# Patient Record
Sex: FEMALE | Race: BLACK OR AFRICAN AMERICAN | NOT HISPANIC OR LATINO
[De-identification: names, ages, dates, MRNs, and addresses within clinical notes are randomized per-mention and may not be internally consistent; named-entity substitution may affect disease eponyms.]

---

## 2017-05-15 ENCOUNTER — RESULT REVIEW (OUTPATIENT)
Age: 37
End: 2017-05-15

## 2017-06-12 PROBLEM — Z00.00 ENCOUNTER FOR PREVENTIVE HEALTH EXAMINATION: Status: ACTIVE | Noted: 2017-06-12

## 2017-06-13 ENCOUNTER — RECORD ABSTRACTING (OUTPATIENT)
Age: 37
End: 2017-06-13

## 2017-06-13 DIAGNOSIS — Z72.3 LACK OF PHYSICAL EXERCISE: ICD-10-CM

## 2017-06-13 DIAGNOSIS — Z82.49 FAMILY HISTORY OF ISCHEMIC HEART DISEASE AND OTHER DISEASES OF THE CIRCULATORY SYSTEM: ICD-10-CM

## 2017-06-13 DIAGNOSIS — Z87.898 PERSONAL HISTORY OF OTHER SPECIFIED CONDITIONS: ICD-10-CM

## 2017-06-13 DIAGNOSIS — F15.90 OTHER STIMULANT USE, UNSPECIFIED, UNCOMPLICATED: ICD-10-CM

## 2017-06-13 DIAGNOSIS — O14.90 UNSPECIFIED PRE-ECLAMPSIA, UNSPECIFIED TRIMESTER: ICD-10-CM

## 2017-06-13 DIAGNOSIS — Z78.9 OTHER SPECIFIED HEALTH STATUS: ICD-10-CM

## 2017-06-21 ENCOUNTER — APPOINTMENT (OUTPATIENT)
Dept: HEART AND VASCULAR | Facility: CLINIC | Age: 37
End: 2017-06-21

## 2017-06-21 ENCOUNTER — LABORATORY RESULT (OUTPATIENT)
Age: 37
End: 2017-06-21

## 2017-06-21 VITALS
TEMPERATURE: 98.1 F | SYSTOLIC BLOOD PRESSURE: 160 MMHG | BODY MASS INDEX: 34.89 KG/M2 | WEIGHT: 214.51 LBS | HEIGHT: 65.75 IN | HEART RATE: 90 BPM | OXYGEN SATURATION: 98 % | DIASTOLIC BLOOD PRESSURE: 84 MMHG

## 2017-06-21 DIAGNOSIS — R73.02 IMPAIRED GLUCOSE TOLERANCE (ORAL): ICD-10-CM

## 2017-06-21 DIAGNOSIS — C81.90 HODGKIN LYMPHOMA, UNSPECIFIED, UNSPECIFIED SITE: ICD-10-CM

## 2017-06-21 DIAGNOSIS — E66.9 OBESITY, UNSPECIFIED: ICD-10-CM

## 2017-06-21 RX ORDER — LISINOPRIL 10 MG/1
10 TABLET ORAL DAILY
Refills: 0 | Status: DISCONTINUED | COMMUNITY
End: 2017-06-21

## 2017-06-22 LAB
ALBUMIN SERPL ELPH-MCNC: 4.2 G/DL
ALP BLD-CCNC: 65 U/L
ALT SERPL-CCNC: 15 U/L
ANION GAP SERPL CALC-SCNC: 16 MMOL/L
AST SERPL-CCNC: 17 U/L
BILIRUB SERPL-MCNC: 0.2 MG/DL
BUN SERPL-MCNC: 8 MG/DL
CALCIUM SERPL-MCNC: 9.6 MG/DL
CHLORIDE SERPL-SCNC: 102 MMOL/L
CO2 SERPL-SCNC: 25 MMOL/L
CREAT SERPL-MCNC: 0.7 MG/DL
GLUCOSE SERPL-MCNC: 101 MG/DL
HBA1C MFR BLD HPLC: 6 %
NT-PROBNP SERPL-MCNC: 214 PG/ML
POTASSIUM SERPL-SCNC: 3.9 MMOL/L
PROT SERPL-MCNC: 8.1 G/DL
SODIUM SERPL-SCNC: 143 MMOL/L
T4 FREE SERPL-MCNC: 1.1 NG/DL

## 2017-07-25 ENCOUNTER — APPOINTMENT (OUTPATIENT)
Dept: HEART AND VASCULAR | Facility: CLINIC | Age: 37
End: 2017-07-25

## 2017-07-25 VITALS
HEIGHT: 65.75 IN | DIASTOLIC BLOOD PRESSURE: 74 MMHG | SYSTOLIC BLOOD PRESSURE: 120 MMHG | BODY MASS INDEX: 33.67 KG/M2 | TEMPERATURE: 97.9 F | WEIGHT: 206.99 LBS | OXYGEN SATURATION: 100 % | HEART RATE: 80 BPM

## 2017-07-25 DIAGNOSIS — I35.1 NONRHEUMATIC AORTIC (VALVE) INSUFFICIENCY: ICD-10-CM

## 2017-07-26 LAB
ANION GAP SERPL CALC-SCNC: 16 MMOL/L
BUN SERPL-MCNC: 11 MG/DL
CALCIUM SERPL-MCNC: 10 MG/DL
CHLORIDE SERPL-SCNC: 99 MMOL/L
CO2 SERPL-SCNC: 24 MMOL/L
CREAT SERPL-MCNC: 0.78 MG/DL
GLUCOSE SERPL-MCNC: 77 MG/DL
POTASSIUM SERPL-SCNC: 4 MMOL/L
SODIUM SERPL-SCNC: 139 MMOL/L

## 2017-08-09 ENCOUNTER — APPOINTMENT (OUTPATIENT)
Dept: INTERNAL MEDICINE | Facility: CLINIC | Age: 37
End: 2017-08-09

## 2017-08-22 ENCOUNTER — APPOINTMENT (OUTPATIENT)
Dept: ENDOCRINOLOGY | Facility: CLINIC | Age: 37
End: 2017-08-22

## 2017-11-01 ENCOUNTER — APPOINTMENT (OUTPATIENT)
Dept: HEART AND VASCULAR | Facility: CLINIC | Age: 37
End: 2017-11-01

## 2017-11-07 ENCOUNTER — RESULT CHARGE (OUTPATIENT)
Age: 37
End: 2017-11-07

## 2017-11-08 ENCOUNTER — APPOINTMENT (OUTPATIENT)
Dept: HEART AND VASCULAR | Facility: CLINIC | Age: 37
End: 2017-11-08
Payer: MEDICAID

## 2017-11-08 VITALS
WEIGHT: 209.99 LBS | TEMPERATURE: 98.1 F | HEIGHT: 65.75 IN | OXYGEN SATURATION: 99 % | HEART RATE: 71 BPM | BODY MASS INDEX: 34.15 KG/M2 | DIASTOLIC BLOOD PRESSURE: 87 MMHG | SYSTOLIC BLOOD PRESSURE: 160 MMHG

## 2017-11-08 DIAGNOSIS — E03.9 HYPOTHYROIDISM, UNSPECIFIED: ICD-10-CM

## 2017-11-08 DIAGNOSIS — M25.559 PAIN IN UNSPECIFIED HIP: ICD-10-CM

## 2017-11-08 PROCEDURE — 99214 OFFICE O/P EST MOD 30 MIN: CPT | Mod: 25

## 2017-11-08 PROCEDURE — 93000 ELECTROCARDIOGRAM COMPLETE: CPT

## 2017-11-14 LAB
ANION GAP SERPL CALC-SCNC: 14 MMOL/L
BUN SERPL-MCNC: 9 MG/DL
CALCIUM SERPL-MCNC: 9.6 MG/DL
CHLORIDE SERPL-SCNC: 102 MMOL/L
CO2 SERPL-SCNC: 25 MMOL/L
CREAT SERPL-MCNC: 0.73 MG/DL
GLUCOSE SERPL-MCNC: 86 MG/DL
POTASSIUM SERPL-SCNC: 3.9 MMOL/L
SODIUM SERPL-SCNC: 141 MMOL/L
T4 FREE SERPL-MCNC: 1.1 NG/DL
TSH SERPL-ACNC: 7.91 UIU/ML

## 2018-08-07 ENCOUNTER — APPOINTMENT (OUTPATIENT)
Dept: HEART AND VASCULAR | Facility: CLINIC | Age: 38
End: 2018-08-07

## 2018-09-25 ENCOUNTER — APPOINTMENT (OUTPATIENT)
Dept: HEART AND VASCULAR | Facility: CLINIC | Age: 38
End: 2018-09-25

## 2018-09-27 ENCOUNTER — LABORATORY RESULT (OUTPATIENT)
Age: 38
End: 2018-09-27

## 2018-09-27 ENCOUNTER — APPOINTMENT (OUTPATIENT)
Dept: HEART AND VASCULAR | Facility: CLINIC | Age: 38
End: 2018-09-27
Payer: MEDICAID

## 2018-09-27 VITALS
OXYGEN SATURATION: 98 % | SYSTOLIC BLOOD PRESSURE: 138 MMHG | HEIGHT: 65.75 IN | DIASTOLIC BLOOD PRESSURE: 72 MMHG | TEMPERATURE: 98.4 F | WEIGHT: 212.99 LBS | HEART RATE: 85 BPM | BODY MASS INDEX: 34.64 KG/M2

## 2018-09-27 PROCEDURE — 36415 COLL VENOUS BLD VENIPUNCTURE: CPT

## 2018-09-27 PROCEDURE — 90630: CPT

## 2018-09-27 PROCEDURE — 99214 OFFICE O/P EST MOD 30 MIN: CPT

## 2018-09-27 PROCEDURE — 93000 ELECTROCARDIOGRAM COMPLETE: CPT

## 2018-09-27 PROCEDURE — 90471 IMMUNIZATION ADMIN: CPT

## 2018-09-28 LAB
ANION GAP SERPL CALC-SCNC: 13 MMOL/L
BUN SERPL-MCNC: 10 MG/DL
CALCIUM SERPL-MCNC: 9.6 MG/DL
CHLORIDE SERPL-SCNC: 103 MMOL/L
CO2 SERPL-SCNC: 24 MMOL/L
CREAT SERPL-MCNC: 0.76 MG/DL
GLUCOSE SERPL-MCNC: 131 MG/DL
HBA1C MFR BLD HPLC: 6 %
POTASSIUM SERPL-SCNC: 3.8 MMOL/L
SODIUM SERPL-SCNC: 140 MMOL/L

## 2018-10-25 ENCOUNTER — APPOINTMENT (OUTPATIENT)
Dept: HEART AND VASCULAR | Facility: CLINIC | Age: 38
End: 2018-10-25
Payer: MEDICAID

## 2018-10-25 DIAGNOSIS — R07.9 CHEST PAIN, UNSPECIFIED: ICD-10-CM

## 2018-10-25 PROCEDURE — 93325 DOPPLER ECHO COLOR FLOW MAPG: CPT | Mod: 59

## 2018-10-25 PROCEDURE — 93320 DOPPLER ECHO COMPLETE: CPT

## 2018-10-25 PROCEDURE — 93351 STRESS TTE COMPLETE: CPT

## 2018-11-01 ENCOUNTER — APPOINTMENT (OUTPATIENT)
Dept: HEART AND VASCULAR | Facility: CLINIC | Age: 38
End: 2018-11-01

## 2018-11-08 ENCOUNTER — APPOINTMENT (OUTPATIENT)
Dept: NEPHROLOGY | Facility: CLINIC | Age: 38
End: 2018-11-08

## 2018-11-08 ENCOUNTER — APPOINTMENT (OUTPATIENT)
Dept: ENDOCRINOLOGY | Facility: CLINIC | Age: 38
End: 2018-11-08
Payer: MEDICAID

## 2018-11-08 VITALS
HEART RATE: 73 BPM | HEIGHT: 66 IN | SYSTOLIC BLOOD PRESSURE: 148 MMHG | BODY MASS INDEX: 34.87 KG/M2 | WEIGHT: 217 LBS | DIASTOLIC BLOOD PRESSURE: 84 MMHG

## 2018-11-08 DIAGNOSIS — E89.0 POSTPROCEDURAL HYPOTHYROIDISM: ICD-10-CM

## 2018-11-08 PROCEDURE — 99204 OFFICE O/P NEW MOD 45 MIN: CPT

## 2018-11-15 ENCOUNTER — APPOINTMENT (OUTPATIENT)
Dept: CT IMAGING | Facility: HOSPITAL | Age: 38
End: 2018-11-15

## 2019-02-07 ENCOUNTER — APPOINTMENT (OUTPATIENT)
Dept: ENDOCRINOLOGY | Facility: CLINIC | Age: 39
End: 2019-02-07

## 2019-06-26 ENCOUNTER — APPOINTMENT (OUTPATIENT)
Dept: CT IMAGING | Facility: HOSPITAL | Age: 39
End: 2019-06-26

## 2019-06-26 ENCOUNTER — APPOINTMENT (OUTPATIENT)
Dept: HEART AND VASCULAR | Facility: CLINIC | Age: 39
End: 2019-06-26

## 2020-02-19 ENCOUNTER — APPOINTMENT (OUTPATIENT)
Dept: HEART AND VASCULAR | Facility: CLINIC | Age: 40
End: 2020-02-19

## 2020-04-09 PROBLEM — M25.559 HIP PAIN: Status: ACTIVE | Noted: 2017-11-08

## 2020-09-25 ENCOUNTER — LABORATORY RESULT (OUTPATIENT)
Age: 40
End: 2020-09-25

## 2020-09-25 ENCOUNTER — APPOINTMENT (OUTPATIENT)
Dept: HEART AND VASCULAR | Facility: CLINIC | Age: 40
End: 2020-09-25
Payer: MEDICAID

## 2020-09-25 ENCOUNTER — NON-APPOINTMENT (OUTPATIENT)
Age: 40
End: 2020-09-25

## 2020-09-25 VITALS
TEMPERATURE: 98.1 F | HEART RATE: 90 BPM | HEIGHT: 65.75 IN | DIASTOLIC BLOOD PRESSURE: 96 MMHG | WEIGHT: 209 LBS | BODY MASS INDEX: 33.99 KG/M2 | OXYGEN SATURATION: 97 % | SYSTOLIC BLOOD PRESSURE: 210 MMHG

## 2020-09-25 DIAGNOSIS — Z23 ENCOUNTER FOR IMMUNIZATION: ICD-10-CM

## 2020-09-25 PROCEDURE — 36415 COLL VENOUS BLD VENIPUNCTURE: CPT

## 2020-09-25 PROCEDURE — 93000 ELECTROCARDIOGRAM COMPLETE: CPT

## 2020-09-25 PROCEDURE — 99214 OFFICE O/P EST MOD 30 MIN: CPT

## 2020-09-25 RX ORDER — CHLORTHALIDONE 25 MG/1
25 TABLET ORAL DAILY
Qty: 90 | Refills: 2 | Status: DISCONTINUED | COMMUNITY
End: 2020-09-25

## 2020-09-25 RX ORDER — FUROSEMIDE 20 MG/1
20 TABLET ORAL
Qty: 90 | Refills: 2 | Status: DISCONTINUED | COMMUNITY
End: 2020-09-25

## 2020-09-28 ENCOUNTER — MED ADMIN CHARGE (OUTPATIENT)
Age: 40
End: 2020-09-28

## 2020-09-29 LAB
ALBUMIN SERPL ELPH-MCNC: 4.7 G/DL
ALP BLD-CCNC: 67 U/L
ALT SERPL-CCNC: 10 U/L
ANION GAP SERPL CALC-SCNC: 21 MMOL/L
AST SERPL-CCNC: 12 U/L
BASOPHILS # BLD AUTO: 0.06 K/UL
BASOPHILS NFR BLD AUTO: 0.5 %
BILIRUB SERPL-MCNC: 0.3 MG/DL
BUN SERPL-MCNC: 13 MG/DL
CALCIUM SERPL-MCNC: 10 MG/DL
CHLORIDE SERPL-SCNC: 101 MMOL/L
CHOLEST SERPL-MCNC: 224 MG/DL
CHOLEST/HDLC SERPL: 4.4 RATIO
CO2 SERPL-SCNC: 17 MMOL/L
CREAT SERPL-MCNC: 0.73 MG/DL
EOSINOPHIL # BLD AUTO: 0.49 K/UL
EOSINOPHIL NFR BLD AUTO: 4.3 %
ESTIMATED AVERAGE GLUCOSE: 126 MG/DL
GLUCOSE SERPL-MCNC: 94 MG/DL
HBA1C MFR BLD HPLC: 6 %
HCT VFR BLD CALC: 36.4 %
HDLC SERPL-MCNC: 52 MG/DL
HGB BLD-MCNC: 10 G/DL
IMM GRANULOCYTES NFR BLD AUTO: 0.3 %
LDLC SERPL CALC-MCNC: 158 MG/DL
LYMPHOCYTES # BLD AUTO: 3.69 K/UL
LYMPHOCYTES NFR BLD AUTO: 32.6 %
MAN DIFF?: NORMAL
MCHC RBC-ENTMCNC: 23.4 PG
MCHC RBC-ENTMCNC: 27.5 GM/DL
MCV RBC AUTO: 85.2 FL
MONOCYTES # BLD AUTO: 0.37 K/UL
MONOCYTES NFR BLD AUTO: 3.3 %
NEUTROPHILS # BLD AUTO: 6.67 K/UL
NEUTROPHILS NFR BLD AUTO: 59 %
NT-PROBNP SERPL-MCNC: 203 PG/ML
PLATELET # BLD AUTO: 266 K/UL
POTASSIUM SERPL-SCNC: 3.9 MMOL/L
PROT SERPL-MCNC: 8.6 G/DL
RBC # BLD: 4.27 M/UL
RBC # FLD: 17.4 %
SARS-COV-2 IGG SERPL IA-ACNC: <0.1 INDEX
SARS-COV-2 IGG SERPL QL IA: NEGATIVE
SODIUM SERPL-SCNC: 139 MMOL/L
T4 FREE SERPL-MCNC: 1.1 NG/DL
TRIGL SERPL-MCNC: 73 MG/DL
TSH SERPL-ACNC: 7.8 UIU/ML
WBC # FLD AUTO: 11.31 K/UL

## 2020-09-29 NOTE — PROCEDURE
[Size: ______] : The left ventricular size is [unfilled] [Wall Motion: ______] : The left ventricular wall motion is [unfilled] [Ejection Fraction: ______] : The left ventricular ejection fraction is [unfilled] [Normal] : 3. No pericardial effusion. [ECG Atrial Arrhythmias] : no arrhythmias [de-identified] : 134 [de-identified] : 180/80 [de-identified] : 75%; METS 7.4 [de-identified] : mild left ventricular global hypokinesis LVEF 45-50% [de-identified] : Submaximal stress test target heart rate was not achieved no wall motion abnormalities post stress images no wall motion abnormalities [TWNoteComboBox1] : АНДРЕЙ [TWNoteComboBox2] : Doug [TWNoteComboBox4] : dyspnea [TWNoteComboBox3] : 2 [TWNoteComboBox5] : dyspnea [de-identified] : Dr. Pascual Malhotra (card) [de-identified] : Nicole Petri-Schoener, [facility] [de-identified] : Chest Pain [de-identified] : 1.0 [de-identified] : 0.9 [de-identified] : 4.8 [de-identified] : 3.8 [de-identified] : 2.9 [de-identified] : 3.5 [de-identified] : 34 mmHg [de-identified] : 45-50% [de-identified] : MIld global hypokinesis  [de-identified] : The left atrial volume index is 25 ml/m2. grade 1 diastolic dysfunction  [de-identified] : Trileaflet with Calcified leaflets mild to moderate aortic eccentric insufficiency aortic sclerosis without stenosis  [de-identified] : There is trace pulmonic regurgitation.   [de-identified] : Trace to mild tricuspid regurgitation. [de-identified] : The inferior vena cava is 1.6 cm. with greater than 50% [de-identified] : Mildly reduced left ventricular systolic function mild left ventricular hypokinesis normal right ventricle [de-identified] : Thickened mitral valve leaflets mild to moderate mitral regurgitation thickedn calcified aortic valve leaflets without stenosis mild to moderate aortic insufficiency [FreeTextEntry1] : : 1980\par Age: 38y\par Sex: F\par BP: 138/72\par Height: 167 cm\par Weight: 97 kg\par BSA: 2.1 m2

## 2020-09-29 NOTE — IMPRESSION
[FreeTextEntry1] : Submaximal echo stress test. Normal post exercise left ventricular function at submaximal heart rate. Normal EKG stress test.

## 2020-09-29 NOTE — HISTORY OF PRESENT ILLNESS
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

## 2020-09-29 NOTE — PHYSICAL EXAM
[General Appearance - Well Developed] : well developed [Normal Appearance] : normal appearance [Well Groomed] : well groomed [General Appearance - Well Nourished] : well nourished [No Deformities] : no deformities [General Appearance - In No Acute Distress] : no acute distress [Normal Conjunctiva] : the conjunctiva exhibited no abnormalities [Eyelids - No Xanthelasma] : the eyelids demonstrated no xanthelasmas [Normal Oral Mucosa] : normal oral mucosa [No Oral Pallor] : no oral pallor [No Oral Cyanosis] : no oral cyanosis [Normal Jugular Venous A Waves Present] : normal jugular venous A waves present [Normal Jugular Venous V Waves Present] : normal jugular venous V waves present [No Jugular Venous Ceron A Waves] : no jugular venous ceron A waves [Respiration, Rhythm And Depth] : normal respiratory rhythm and effort [Exaggerated Use Of Accessory Muscles For Inspiration] : no accessory muscle use [Auscultation Breath Sounds / Voice Sounds] : lungs were clear to auscultation bilaterally [Heart Rate And Rhythm] : heart rate and rhythm were normal [Heart Sounds] : normal S1 and S2 [Murmurs] : no murmurs present [Abdomen Soft] : soft [Abdomen Tenderness] : non-tender [Abdomen Mass (___ Cm)] : no abdominal mass palpated [Abnormal Walk] : normal gait [Gait - Sufficient For Exercise Testing] : the gait was sufficient for exercise testing [Nail Clubbing] : no clubbing of the fingernails [Cyanosis, Localized] : no localized cyanosis [Petechial Hemorrhages (___cm)] : no petechial hemorrhages [Skin Color & Pigmentation] : normal skin color and pigmentation [] : no rash [No Venous Stasis] : no venous stasis [Skin Lesions] : no skin lesions [No Skin Ulcers] : no skin ulcer [No Xanthoma] : no  xanthoma was observed [Oriented To Time, Place, And Person] : oriented to person, place, and time [Affect] : the affect was normal [Mood] : the mood was normal [No Anxiety] : not feeling anxious

## 2020-09-29 NOTE — ASSESSMENT
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

## 2020-09-30 ENCOUNTER — APPOINTMENT (OUTPATIENT)
Dept: HEART AND VASCULAR | Facility: CLINIC | Age: 40
End: 2020-09-30
Payer: MEDICAID

## 2020-09-30 VITALS
OXYGEN SATURATION: 97 % | HEART RATE: 90 BPM | BODY MASS INDEX: 34.82 KG/M2 | HEIGHT: 65 IN | SYSTOLIC BLOOD PRESSURE: 126 MMHG | WEIGHT: 209 LBS | DIASTOLIC BLOOD PRESSURE: 74 MMHG

## 2020-09-30 PROCEDURE — 99214 OFFICE O/P EST MOD 30 MIN: CPT

## 2020-09-30 NOTE — PROCEDURE
[ECG Atrial Arrhythmias] : no arrhythmias [de-identified] : 134 [de-identified] : 180/80 [de-identified] : 75%; METS 7.4 [de-identified] : mild left ventricular global hypokinesis LVEF 45-50% [de-identified] : Submaximal stress test target heart rate was not achieved no wall motion abnormalities post stress images no wall motion abnormalities [TWNoteComboBox1] : АНДРЙЕ [TWNoteComboBox2] : Doug [TWNoteComboBox4] : dyspnea [TWNoteComboBox3] : 2 [TWNoteComboBox5] : dyspnea [Size: ______] : The left ventricular size is [unfilled] [Wall Motion: ______] : The left ventricular wall motion is [unfilled] [Ejection Fraction: ______] : The left ventricular ejection fraction is [unfilled] [Normal] : 3. No pericardial effusion. [de-identified] : Dr. Pascual Malhotra (card) [de-identified] : Nicole Petri-Schoener, [facility] [de-identified] : Chest Pain [de-identified] : 1.0 [de-identified] : 0.9 [de-identified] : 4.8 [de-identified] : 3.8 [de-identified] : 2.9 [de-identified] : 3.5 [de-identified] : 34 mmHg [de-identified] : 45-50% [de-identified] : MIld global hypokinesis  [de-identified] : The left atrial volume index is 25 ml/m2. grade 1 diastolic dysfunction  [de-identified] : Trileaflet with Calcified leaflets mild to moderate aortic eccentric insufficiency aortic sclerosis without stenosis  [de-identified] : There is trace pulmonic regurgitation.   [de-identified] : Trace to mild tricuspid regurgitation. [de-identified] : The inferior vena cava is 1.6 cm. with greater than 50% [de-identified] : Mildly reduced left ventricular systolic function mild left ventricular hypokinesis normal right ventricle [de-identified] : Thickened mitral valve leaflets mild to moderate mitral regurgitation thickedn calcified aortic valve leaflets without stenosis mild to moderate aortic insufficiency [FreeTextEntry1] : : 1980\par Age: 38y\par Sex: F\par BP: 138/72\par Height: 167 cm\par Weight: 97 kg\par BSA: 2.1 m2

## 2020-09-30 NOTE — ASSESSMENT
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

## 2020-09-30 NOTE — HISTORY OF PRESENT ILLNESS
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

## 2020-10-23 ENCOUNTER — APPOINTMENT (OUTPATIENT)
Dept: HEART AND VASCULAR | Facility: CLINIC | Age: 40
End: 2020-10-23

## 2020-12-30 ENCOUNTER — APPOINTMENT (OUTPATIENT)
Dept: HEART AND VASCULAR | Facility: CLINIC | Age: 40
End: 2020-12-30
Payer: MEDICAID

## 2020-12-30 ENCOUNTER — NON-APPOINTMENT (OUTPATIENT)
Age: 40
End: 2020-12-30

## 2020-12-30 VITALS
HEART RATE: 87 BPM | SYSTOLIC BLOOD PRESSURE: 146 MMHG | OXYGEN SATURATION: 99 % | HEIGHT: 65 IN | TEMPERATURE: 98.6 F | WEIGHT: 212.99 LBS | BODY MASS INDEX: 35.49 KG/M2 | DIASTOLIC BLOOD PRESSURE: 80 MMHG

## 2020-12-30 PROCEDURE — 93306 TTE W/DOPPLER COMPLETE: CPT

## 2020-12-30 PROCEDURE — 36415 COLL VENOUS BLD VENIPUNCTURE: CPT

## 2020-12-30 PROCEDURE — 99214 OFFICE O/P EST MOD 30 MIN: CPT

## 2020-12-30 PROCEDURE — 99072 ADDL SUPL MATRL&STAF TM PHE: CPT

## 2020-12-30 PROCEDURE — 93000 ELECTROCARDIOGRAM COMPLETE: CPT

## 2020-12-30 NOTE — ASSESSMENT
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

## 2020-12-30 NOTE — PROCEDURE
[Size: ______] : The left ventricular size is [unfilled] [Wall Motion: ______] : The left ventricular wall motion is [unfilled] [Ejection Fraction: ______] : The left ventricular ejection fraction is [unfilled] [Normal] : 3. No pericardial effusion. [ECG Atrial Arrhythmias] : no arrhythmias [de-identified] : 134 [de-identified] : 180/80 [de-identified] : 75%; METS 7.4 [de-identified] : mild left ventricular global hypokinesis LVEF 45-50% [de-identified] : Submaximal stress test target heart rate was not achieved no wall motion abnormalities post stress images no wall motion abnormalities [TWNoteComboBox1] : АНДРЕЙ [TWNoteComboBox2] : Doug [TWNoteComboBox4] : dyspnea [TWNoteComboBox3] : 2 [TWNoteComboBox5] : dyspnea [de-identified] : Dr. Pascual Malhotra (card) [de-identified] : Nicole Petri-Schoener, [facility] [de-identified] : Chest Pain [de-identified] : 1.0 [de-identified] : 0.9 [de-identified] : 4.8 [de-identified] : 3.8 [de-identified] : 2.9 [de-identified] : 3.5 [de-identified] : 34 mmHg [de-identified] : 45-50% [de-identified] : MIld global hypokinesis  [de-identified] : The left atrial volume index is 25 ml/m2. grade 1 diastolic dysfunction  [de-identified] : Trileaflet with Calcified leaflets mild to moderate aortic eccentric insufficiency aortic sclerosis without stenosis  [de-identified] : There is trace pulmonic regurgitation.   [de-identified] : Trace to mild tricuspid regurgitation. [de-identified] : The inferior vena cava is 1.6 cm. with greater than 50% [de-identified] : Mildly reduced left ventricular systolic function mild left ventricular hypokinesis normal right ventricle [de-identified] : Thickened mitral valve leaflets mild to moderate mitral regurgitation thickedn calcified aortic valve leaflets without stenosis mild to moderate aortic insufficiency [FreeTextEntry1] : : 1980\par Age: 38y\par Sex: F\par BP: 138/72\par Height: 167 cm\par Weight: 97 kg\par BSA: 2.1 m2

## 2020-12-30 NOTE — PHYSICAL EXAM
[General Appearance - Well Developed] : well developed [Normal Appearance] : normal appearance [Well Groomed] : well groomed [General Appearance - Well Nourished] : well nourished [No Deformities] : no deformities [General Appearance - In No Acute Distress] : no acute distress [Normal Conjunctiva] : the conjunctiva exhibited no abnormalities [Eyelids - No Xanthelasma] : the eyelids demonstrated no xanthelasmas [Normal Oral Mucosa] : normal oral mucosa [No Oral Pallor] : no oral pallor [No Oral Cyanosis] : no oral cyanosis [Normal Jugular Venous A Waves Present] : normal jugular venous A waves present [Normal Jugular Venous V Waves Present] : normal jugular venous V waves present [No Jugular Venous Ceron A Waves] : no jugular venous ceron A waves [Respiration, Rhythm And Depth] : normal respiratory rhythm and effort [Exaggerated Use Of Accessory Muscles For Inspiration] : no accessory muscle use [Auscultation Breath Sounds / Voice Sounds] : lungs were clear to auscultation bilaterally [Heart Rate And Rhythm] : heart rate and rhythm were normal [Heart Sounds] : normal S1 and S2 [Murmurs] : no murmurs present [Abdomen Soft] : soft [Abdomen Tenderness] : non-tender [Abdomen Mass (___ Cm)] : no abdominal mass palpated [Abnormal Walk] : normal gait [Gait - Sufficient For Exercise Testing] : the gait was sufficient for exercise testing [Nail Clubbing] : no clubbing of the fingernails [Cyanosis, Localized] : no localized cyanosis [Petechial Hemorrhages (___cm)] : no petechial hemorrhages [Skin Color & Pigmentation] : normal skin color and pigmentation [] : no rash [No Venous Stasis] : no venous stasis [Skin Lesions] : no skin lesions [No Skin Ulcers] : no skin ulcer [No Xanthoma] : no  xanthoma was observed [Oriented To Time, Place, And Person] : oriented to person, place, and time [Affect] : the affect was normal [Mood] : the mood was normal [No Anxiety] : not feeling anxious [FreeTextEntry1] : JEREMIAH to carotids

## 2020-12-31 DIAGNOSIS — J98.4 OTHER DISORDERS OF LUNG: ICD-10-CM

## 2020-12-31 DIAGNOSIS — E78.5 HYPERLIPIDEMIA, UNSPECIFIED: ICD-10-CM

## 2020-12-31 DIAGNOSIS — T45.1X5A OTHER DISORDERS OF LUNG: ICD-10-CM

## 2020-12-31 LAB
ALBUMIN SERPL ELPH-MCNC: 4.2 G/DL
ALP BLD-CCNC: 67 U/L
ALT SERPL-CCNC: 10 U/L
ANION GAP SERPL CALC-SCNC: 11 MMOL/L
AST SERPL-CCNC: 12 U/L
BASOPHILS # BLD AUTO: 0.05 K/UL
BASOPHILS NFR BLD AUTO: 0.6 %
BILIRUB SERPL-MCNC: 0.2 MG/DL
BUN SERPL-MCNC: 10 MG/DL
CALCIUM SERPL-MCNC: 9.4 MG/DL
CHLORIDE SERPL-SCNC: 103 MMOL/L
CHOLEST SERPL-MCNC: 237 MG/DL
CO2 SERPL-SCNC: 24 MMOL/L
CREAT SERPL-MCNC: 0.8 MG/DL
EOSINOPHIL # BLD AUTO: 0.45 K/UL
EOSINOPHIL NFR BLD AUTO: 5.2 %
ESTIMATED AVERAGE GLUCOSE: 120 MG/DL
GLUCOSE SERPL-MCNC: 94 MG/DL
HBA1C MFR BLD HPLC: 5.8 %
HCT VFR BLD CALC: 31.7 %
HDLC SERPL-MCNC: 48 MG/DL
HGB BLD-MCNC: 9.4 G/DL
IMM GRANULOCYTES NFR BLD AUTO: 0.1 %
LDLC SERPL CALC-MCNC: 170 MG/DL
LYMPHOCYTES # BLD AUTO: 2.11 K/UL
LYMPHOCYTES NFR BLD AUTO: 24.3 %
MAN DIFF?: NORMAL
MCHC RBC-ENTMCNC: 24.4 PG
MCHC RBC-ENTMCNC: 29.7 GM/DL
MCV RBC AUTO: 82.1 FL
MONOCYTES # BLD AUTO: 0.5 K/UL
MONOCYTES NFR BLD AUTO: 5.8 %
NEUTROPHILS # BLD AUTO: 5.56 K/UL
NEUTROPHILS NFR BLD AUTO: 64 %
NONHDLC SERPL-MCNC: 189 MG/DL
NT-PROBNP SERPL-MCNC: 58 PG/ML
PLATELET # BLD AUTO: 294 K/UL
POTASSIUM SERPL-SCNC: 4 MMOL/L
PROT SERPL-MCNC: 7.6 G/DL
RBC # BLD: 3.86 M/UL
RBC # FLD: 16.2 %
SODIUM SERPL-SCNC: 138 MMOL/L
T4 FREE SERPL-MCNC: 1 NG/DL
TRIGL SERPL-MCNC: 94 MG/DL
TSH SERPL-ACNC: 11.6 UIU/ML
WBC # FLD AUTO: 8.68 K/UL

## 2021-01-03 LAB
FERRITIN SERPL-MCNC: 13 NG/ML
IRON SATN MFR SERPL: 11 %
IRON SERPL-MCNC: 40 UG/DL
TIBC SERPL-MCNC: 355 UG/DL
UIBC SERPL-MCNC: 315 UG/DL

## 2021-01-22 ENCOUNTER — APPOINTMENT (OUTPATIENT)
Dept: HEART AND VASCULAR | Facility: CLINIC | Age: 41
End: 2021-01-22
Payer: MEDICAID

## 2021-01-22 VITALS
HEART RATE: 86 BPM | OXYGEN SATURATION: 98 % | TEMPERATURE: 98.5 F | SYSTOLIC BLOOD PRESSURE: 113 MMHG | DIASTOLIC BLOOD PRESSURE: 71 MMHG | BODY MASS INDEX: 34.49 KG/M2 | HEIGHT: 65 IN | WEIGHT: 206.99 LBS

## 2021-01-22 PROCEDURE — 99072 ADDL SUPL MATRL&STAF TM PHE: CPT

## 2021-01-22 PROCEDURE — 36415 COLL VENOUS BLD VENIPUNCTURE: CPT

## 2021-01-25 LAB
ANION GAP SERPL CALC-SCNC: 14 MMOL/L
BUN SERPL-MCNC: 14 MG/DL
CALCIUM SERPL-MCNC: 9.5 MG/DL
CHLORIDE SERPL-SCNC: 100 MMOL/L
CO2 SERPL-SCNC: 26 MMOL/L
CREAT SERPL-MCNC: 0.9 MG/DL
GLUCOSE SERPL-MCNC: 92 MG/DL
NT-PROBNP SERPL-MCNC: 32 PG/ML
POTASSIUM SERPL-SCNC: 3.8 MMOL/L
SODIUM SERPL-SCNC: 140 MMOL/L

## 2021-01-25 NOTE — ASSESSMENT
[FreeTextEntry1] : AS/AI stable\par htn controlled on current regimen \par needs to see endo for hypothyroid\par hpl should be on statin therapy \par fu in three months

## 2021-01-25 NOTE — PHYSICAL EXAM
[General Appearance - Well Developed] : well developed [Normal Appearance] : normal appearance [Well Groomed] : well groomed [No Deformities] : no deformities [General Appearance - Well Nourished] : well nourished [General Appearance - In No Acute Distress] : no acute distress [Normal Conjunctiva] : the conjunctiva exhibited no abnormalities [Eyelids - No Xanthelasma] : the eyelids demonstrated no xanthelasmas [Normal Oral Mucosa] : normal oral mucosa [No Oral Pallor] : no oral pallor [No Oral Cyanosis] : no oral cyanosis [Normal Jugular Venous A Waves Present] : normal jugular venous A waves present [Normal Jugular Venous V Waves Present] : normal jugular venous V waves present [No Jugular Venous Ceron A Waves] : no jugular venous ceron A waves [Respiration, Rhythm And Depth] : normal respiratory rhythm and effort [Auscultation Breath Sounds / Voice Sounds] : lungs were clear to auscultation bilaterally [Exaggerated Use Of Accessory Muscles For Inspiration] : no accessory muscle use [Heart Rate And Rhythm] : heart rate and rhythm were normal [Heart Sounds] : normal S1 and S2 [Murmurs] : no murmurs present [Abdomen Soft] : soft [Abdomen Tenderness] : non-tender [Abdomen Mass (___ Cm)] : no abdominal mass palpated [Abnormal Walk] : normal gait [Gait - Sufficient For Exercise Testing] : the gait was sufficient for exercise testing [Nail Clubbing] : no clubbing of the fingernails [Cyanosis, Localized] : no localized cyanosis [Petechial Hemorrhages (___cm)] : no petechial hemorrhages [Skin Color & Pigmentation] : normal skin color and pigmentation [] : no rash [No Venous Stasis] : no venous stasis [Skin Lesions] : no skin lesions [No Skin Ulcers] : no skin ulcer [No Xanthoma] : no  xanthoma was observed [Oriented To Time, Place, And Person] : oriented to person, place, and time [Affect] : the affect was normal [Mood] : the mood was normal [No Anxiety] : not feeling anxious [FreeTextEntry1] : JEREMIAH to carotids

## 2021-01-25 NOTE — HISTORY OF PRESENT ILLNESS
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

## 2021-01-25 NOTE — PROCEDURE
[Size: ______] : The left ventricular size is [unfilled] [Wall Motion: ______] : The left ventricular wall motion is [unfilled] [Ejection Fraction: ______] : The left ventricular ejection fraction is [unfilled] [Normal] : 3. No pericardial effusion. [ECG Atrial Arrhythmias] : no arrhythmias [de-identified] : 134 [de-identified] : 180/80 [de-identified] : 75%; METS 7.4 [de-identified] : mild left ventricular global hypokinesis LVEF 45-50% [de-identified] : Submaximal stress test target heart rate was not achieved no wall motion abnormalities post stress images no wall motion abnormalities [TWNoteComboBox1] : АНДРЕЙ [TWNoteComboBox2] : Doug [TWNoteComboBox4] : dyspnea [TWNoteComboBox3] : 2 [TWNoteComboBox5] : dyspnea [de-identified] : Dr. Pascual Malhotra (card) [de-identified] : Nicole Petri-Schoener, [facility] [de-identified] : Chest Pain [de-identified] : 1.0 [de-identified] : 0.9 [de-identified] : 4.8 [de-identified] : 3.8 [de-identified] : 2.9 [de-identified] : 3.5 [de-identified] : 34 mmHg [de-identified] : 45-50% [de-identified] : MIld global hypokinesis  [de-identified] : The left atrial volume index is 25 ml/m2. grade 1 diastolic dysfunction  [de-identified] : Trileaflet with Calcified leaflets mild to moderate aortic eccentric insufficiency aortic sclerosis without stenosis  [de-identified] : There is trace pulmonic regurgitation.   [de-identified] : Trace to mild tricuspid regurgitation. [de-identified] : The inferior vena cava is 1.6 cm. with greater than 50% [de-identified] : Mildly reduced left ventricular systolic function mild left ventricular hypokinesis normal right ventricle [de-identified] : Thickened mitral valve leaflets mild to moderate mitral regurgitation thickedn calcified aortic valve leaflets without stenosis mild to moderate aortic insufficiency [FreeTextEntry1] : : 1980\par Age: 38y\par Sex: F\par BP: 138/72\par Height: 167 cm\par Weight: 97 kg\par BSA: 2.1 m2

## 2021-09-03 ENCOUNTER — APPOINTMENT (OUTPATIENT)
Dept: HEART AND VASCULAR | Facility: CLINIC | Age: 41
End: 2021-09-03
Payer: MEDICAID

## 2021-09-03 VITALS
WEIGHT: 211 LBS | HEART RATE: 80 BPM | OXYGEN SATURATION: 97 % | HEIGHT: 65 IN | TEMPERATURE: 97.3 F | DIASTOLIC BLOOD PRESSURE: 81 MMHG | SYSTOLIC BLOOD PRESSURE: 151 MMHG | BODY MASS INDEX: 35.16 KG/M2

## 2021-09-03 PROCEDURE — 99214 OFFICE O/P EST MOD 30 MIN: CPT

## 2021-09-03 PROCEDURE — 36415 COLL VENOUS BLD VENIPUNCTURE: CPT

## 2021-09-06 LAB
ALBUMIN SERPL ELPH-MCNC: 4.4 G/DL
ALP BLD-CCNC: 73 U/L
ALT SERPL-CCNC: 17 U/L
ANION GAP SERPL CALC-SCNC: 14 MMOL/L
AST SERPL-CCNC: 15 U/L
BASOPHILS # BLD AUTO: 0.04 K/UL
BASOPHILS NFR BLD AUTO: 0.5 %
BILIRUB SERPL-MCNC: 0.4 MG/DL
BUN SERPL-MCNC: 10 MG/DL
CALCIUM SERPL-MCNC: 9.8 MG/DL
CHLORIDE SERPL-SCNC: 104 MMOL/L
CHOLEST SERPL-MCNC: 226 MG/DL
CO2 SERPL-SCNC: 23 MMOL/L
COVID-19 SPIKE DOMAIN ANTIBODY INTERPRETATION: NEGATIVE
CREAT SERPL-MCNC: 0.74 MG/DL
EOSINOPHIL # BLD AUTO: 0.37 K/UL
EOSINOPHIL NFR BLD AUTO: 4.6 %
ESTIMATED AVERAGE GLUCOSE: 120 MG/DL
GLUCOSE SERPL-MCNC: 93 MG/DL
HBA1C MFR BLD HPLC: 5.8 %
HCT VFR BLD CALC: 36.3 %
HDLC SERPL-MCNC: 51 MG/DL
HGB BLD-MCNC: 11 G/DL
IMM GRANULOCYTES NFR BLD AUTO: 0.2 %
LDLC SERPL CALC-MCNC: 159 MG/DL
LYMPHOCYTES # BLD AUTO: 2.09 K/UL
LYMPHOCYTES NFR BLD AUTO: 26 %
MAN DIFF?: NORMAL
MCHC RBC-ENTMCNC: 25.9 PG
MCHC RBC-ENTMCNC: 30.3 GM/DL
MCV RBC AUTO: 85.6 FL
MONOCYTES # BLD AUTO: 0.43 K/UL
MONOCYTES NFR BLD AUTO: 5.3 %
NEUTROPHILS # BLD AUTO: 5.09 K/UL
NEUTROPHILS NFR BLD AUTO: 63.4 %
NONHDLC SERPL-MCNC: 175 MG/DL
NT-PROBNP SERPL-MCNC: 216 PG/ML
PLATELET # BLD AUTO: 245 K/UL
POTASSIUM SERPL-SCNC: 4.1 MMOL/L
PROT SERPL-MCNC: 8.1 G/DL
RBC # BLD: 4.24 M/UL
RBC # FLD: 15.8 %
SARS-COV-2 AB SERPL IA-ACNC: 0.4 U/ML
SODIUM SERPL-SCNC: 140 MMOL/L
T4 FREE SERPL-MCNC: 1.1 NG/DL
TRIGL SERPL-MCNC: 78 MG/DL
TSH SERPL-ACNC: 7.9 UIU/ML
WBC # FLD AUTO: 8.04 K/UL

## 2021-09-06 NOTE — PHYSICAL EXAM
[Normal Appearance] : normal appearance [General Appearance - Well Developed] : well developed [Well Groomed] : well groomed [General Appearance - Well Nourished] : well nourished [No Deformities] : no deformities [General Appearance - In No Acute Distress] : no acute distress [Eyelids - No Xanthelasma] : the eyelids demonstrated no xanthelasmas [Normal Oral Mucosa] : normal oral mucosa [No Oral Pallor] : no oral pallor [No Oral Cyanosis] : no oral cyanosis [Normal Jugular Venous A Waves Present] : normal jugular venous A waves present [Normal Jugular Venous V Waves Present] : normal jugular venous V waves present [No Jugular Venous Ceron A Waves] : no jugular venous ceron A waves [Respiration, Rhythm And Depth] : normal respiratory rhythm and effort [Exaggerated Use Of Accessory Muscles For Inspiration] : no accessory muscle use [Auscultation Breath Sounds / Voice Sounds] : lungs were clear to auscultation bilaterally [Heart Rate And Rhythm] : heart rate and rhythm were normal [Heart Sounds] : normal S1 and S2 [Murmurs] : no murmurs present [Abdomen Tenderness] : non-tender [Abdomen Soft] : soft [Abdomen Mass (___ Cm)] : no abdominal mass palpated [Abnormal Walk] : normal gait [Gait - Sufficient For Exercise Testing] : the gait was sufficient for exercise testing [Nail Clubbing] : no clubbing of the fingernails [Cyanosis, Localized] : no localized cyanosis [Petechial Hemorrhages (___cm)] : no petechial hemorrhages [Skin Color & Pigmentation] : normal skin color and pigmentation [] : no rash [No Venous Stasis] : no venous stasis [Skin Lesions] : no skin lesions [No Skin Ulcers] : no skin ulcer [No Xanthoma] : no  xanthoma was observed [Oriented To Time, Place, And Person] : oriented to person, place, and time [Affect] : the affect was normal [Mood] : the mood was normal [No Anxiety] : not feeling anxious [Well Developed] : well developed [Well Nourished] : well nourished [No Acute Distress] : no acute distress [Normal Conjunctiva] : normal conjunctiva [Normal Venous Pressure] : normal venous pressure [No Carotid Bruit] : no carotid bruit [No Murmur] : no murmur [Normal S1, S2] : normal S1, S2 [No Rub] : no rub [No Gallop] : no gallop [Clear Lung Fields] : clear lung fields [Good Air Entry] : good air entry [No Respiratory Distress] : no respiratory distress  [Soft] : abdomen soft [No Masses/organomegaly] : no masses/organomegaly [Non Tender] : non-tender [Normal Bowel Sounds] : normal bowel sounds [Normal Gait] : normal gait [No Edema] : no edema [No Cyanosis] : no cyanosis [No Clubbing] : no clubbing [No Varicosities] : no varicosities [No Rash] : no rash [Moves all extremities] : moves all extremities [No Skin Lesions] : no skin lesions [No Focal Deficits] : no focal deficits [Normal Speech] : normal speech [Alert and Oriented] : alert and oriented [Normal memory] : normal memory [FreeTextEntry1] : JEREMIAH to carotids

## 2021-09-06 NOTE — ASSESSMENT
[FreeTextEntry1] : AS/AI stable\par htn did not take her meds  \par needs to see endo for hypothyroid\par hpl should be on statin therapy given her preeclampsia will discuss with her \par fu in three months

## 2021-09-06 NOTE — PROCEDURE
[Size: ______] : The left ventricular size is [unfilled] [Wall Motion: ______] : The left ventricular wall motion is [unfilled] [Ejection Fraction: ______] : The left ventricular ejection fraction is [unfilled] [Normal] : 3. No pericardial effusion. [ECG Atrial Arrhythmias] : no arrhythmias [de-identified] : 134 [de-identified] : 180/80 [de-identified] : mild left ventricular global hypokinesis LVEF 45-50% [de-identified] : 75%; METS 7.4 [de-identified] : Submaximal stress test target heart rate was not achieved no wall motion abnormalities post stress images no wall motion abnormalities [TWNoteComboBox1] : АНДРЕЙ [TWNoteComboBox4] : dyspnea [TWNoteComboBox2] : Doug [TWNoteComboBox3] : 2 [TWNoteComboBox5] : dyspnea [de-identified] : Dr. Pascual Malhotra (card) [de-identified] : Chest Pain [de-identified] : Nicole Petri-Schoener, [facility] [de-identified] : 1.0 [de-identified] : 0.9 [de-identified] : 3.8 [de-identified] : 4.8 [de-identified] : 2.9 [de-identified] : 3.5 [de-identified] : 34 mmHg [de-identified] : 45-50% [de-identified] : MIld global hypokinesis  [de-identified] : The left atrial volume index is 25 ml/m2. grade 1 diastolic dysfunction  [de-identified] : Trileaflet with Calcified leaflets mild to moderate aortic eccentric insufficiency aortic sclerosis without stenosis  [de-identified] : There is trace pulmonic regurgitation.   [de-identified] : Trace to mild tricuspid regurgitation. [de-identified] : The inferior vena cava is 1.6 cm. with greater than 50% [de-identified] : Mildly reduced left ventricular systolic function mild left ventricular hypokinesis normal right ventricle [de-identified] : Thickened mitral valve leaflets mild to moderate mitral regurgitation thickedn calcified aortic valve leaflets without stenosis mild to moderate aortic insufficiency [FreeTextEntry1] : : 1980\par Age: 38y\par Sex: F\par BP: 138/72\par Height: 167 cm\par Weight: 97 kg\par BSA: 2.1 m2

## 2021-09-06 NOTE — REASON FOR VISIT
[Cardiac Failure] : cardiac failure [Follow-Up - Clinic] : a clinic follow-up of [FreeTextEntry2] : chest pain

## 2021-09-22 NOTE — HISTORY OF PRESENT ILLNESS
Monday , Wed Friday     Needs after 9:30- Needs to be out at Noon       Tues/Thurs     Can come 7:30-7:45   Needs to be out the door by noon
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

## 2022-02-18 ENCOUNTER — APPOINTMENT (OUTPATIENT)
Dept: HEART AND VASCULAR | Facility: CLINIC | Age: 42
End: 2022-02-18

## 2022-06-02 VITALS
WEIGHT: 195.99 LBS | RESPIRATION RATE: 20 BRPM | DIASTOLIC BLOOD PRESSURE: 83 MMHG | HEIGHT: 66 IN | HEART RATE: 74 BPM | OXYGEN SATURATION: 99 % | TEMPERATURE: 99 F | SYSTOLIC BLOOD PRESSURE: 157 MMHG

## 2022-06-02 LAB
ALBUMIN SERPL ELPH-MCNC: 4 G/DL — SIGNIFICANT CHANGE UP (ref 3.3–5)
ALP SERPL-CCNC: 82 U/L — SIGNIFICANT CHANGE UP (ref 40–120)
ALT FLD-CCNC: 60 U/L — HIGH (ref 10–45)
ANION GAP SERPL CALC-SCNC: 12 MMOL/L — SIGNIFICANT CHANGE UP (ref 5–17)
ANISOCYTOSIS BLD QL: SLIGHT — SIGNIFICANT CHANGE UP
APTT BLD: 35 SEC — SIGNIFICANT CHANGE UP (ref 27.5–35.5)
AST SERPL-CCNC: 47 U/L — HIGH (ref 10–40)
BASOPHILS # BLD AUTO: 0.08 K/UL — SIGNIFICANT CHANGE UP (ref 0–0.2)
BASOPHILS NFR BLD AUTO: 0.9 % — SIGNIFICANT CHANGE UP (ref 0–2)
BILIRUB SERPL-MCNC: 0.2 MG/DL — SIGNIFICANT CHANGE UP (ref 0.2–1.2)
BUN SERPL-MCNC: 13 MG/DL — SIGNIFICANT CHANGE UP (ref 7–23)
BURR CELLS BLD QL SMEAR: PRESENT — SIGNIFICANT CHANGE UP
BURR CELLS BLD QL SMEAR: SLIGHT — SIGNIFICANT CHANGE UP
CALCIUM SERPL-MCNC: 9.4 MG/DL — SIGNIFICANT CHANGE UP (ref 8.4–10.5)
CHLORIDE SERPL-SCNC: 104 MMOL/L — SIGNIFICANT CHANGE UP (ref 96–108)
CO2 SERPL-SCNC: 24 MMOL/L — SIGNIFICANT CHANGE UP (ref 22–31)
CREAT SERPL-MCNC: 0.83 MG/DL — SIGNIFICANT CHANGE UP (ref 0.5–1.3)
EGFR: 90 ML/MIN/1.73M2 — SIGNIFICANT CHANGE UP
EOSINOPHIL # BLD AUTO: 1.01 K/UL — HIGH (ref 0–0.5)
EOSINOPHIL NFR BLD AUTO: 10.7 % — HIGH (ref 0–6)
GIANT PLATELETS BLD QL SMEAR: PRESENT — SIGNIFICANT CHANGE UP
GLUCOSE SERPL-MCNC: 108 MG/DL — HIGH (ref 70–99)
HCT VFR BLD CALC: 29.5 % — LOW (ref 34.5–45)
HGB BLD-MCNC: 9.4 G/DL — LOW (ref 11.5–15.5)
INR BLD: 1.37 — HIGH (ref 0.88–1.16)
LYMPHOCYTES # BLD AUTO: 2.77 K/UL — SIGNIFICANT CHANGE UP (ref 1–3.3)
LYMPHOCYTES # BLD AUTO: 29.5 % — SIGNIFICANT CHANGE UP (ref 13–44)
MANUAL SMEAR VERIFICATION: SIGNIFICANT CHANGE UP
MCHC RBC-ENTMCNC: 24.5 PG — LOW (ref 27–34)
MCHC RBC-ENTMCNC: 31.9 GM/DL — LOW (ref 32–36)
MCV RBC AUTO: 77 FL — LOW (ref 80–100)
MICROCYTES BLD QL: SLIGHT — SIGNIFICANT CHANGE UP
MONOCYTES # BLD AUTO: 0.5 K/UL — SIGNIFICANT CHANGE UP (ref 0–0.9)
MONOCYTES NFR BLD AUTO: 5.3 % — SIGNIFICANT CHANGE UP (ref 2–14)
NEUTROPHILS # BLD AUTO: 5.04 K/UL — SIGNIFICANT CHANGE UP (ref 1.8–7.4)
NEUTROPHILS NFR BLD AUTO: 53.6 % — SIGNIFICANT CHANGE UP (ref 43–77)
PLAT MORPH BLD: ABNORMAL
PLATELET # BLD AUTO: 248 K/UL — SIGNIFICANT CHANGE UP (ref 150–400)
POLYCHROMASIA BLD QL SMEAR: SLIGHT — SIGNIFICANT CHANGE UP
POTASSIUM SERPL-MCNC: 3.6 MMOL/L — SIGNIFICANT CHANGE UP (ref 3.5–5.3)
POTASSIUM SERPL-SCNC: 3.6 MMOL/L — SIGNIFICANT CHANGE UP (ref 3.5–5.3)
PROT SERPL-MCNC: 7.9 G/DL — SIGNIFICANT CHANGE UP (ref 6–8.3)
PROTHROM AB SERPL-ACNC: 16.3 SEC — HIGH (ref 10.5–13.4)
RBC # BLD: 3.83 M/UL — SIGNIFICANT CHANGE UP (ref 3.8–5.2)
RBC # FLD: 15.9 % — HIGH (ref 10.3–14.5)
RBC BLD AUTO: ABNORMAL
SARS-COV-2 RNA SPEC QL NAA+PROBE: NEGATIVE — SIGNIFICANT CHANGE UP
SMUDGE CELLS # BLD: PRESENT — SIGNIFICANT CHANGE UP
SODIUM SERPL-SCNC: 140 MMOL/L — SIGNIFICANT CHANGE UP (ref 135–145)
TROPONIN T SERPL-MCNC: 0.94 NG/ML — CRITICAL HIGH (ref 0–0.01)
WBC # BLD: 9.4 K/UL — SIGNIFICANT CHANGE UP (ref 3.8–10.5)
WBC # FLD AUTO: 9.4 K/UL — SIGNIFICANT CHANGE UP (ref 3.8–10.5)

## 2022-06-02 PROCEDURE — 93010 ELECTROCARDIOGRAM REPORT: CPT

## 2022-06-02 PROCEDURE — 99285 EMERGENCY DEPT VISIT HI MDM: CPT

## 2022-06-02 RX ORDER — FUROSEMIDE 40 MG
40 TABLET ORAL ONCE
Refills: 0 | Status: COMPLETED | OUTPATIENT
Start: 2022-06-02 | End: 2022-06-02

## 2022-06-02 RX ORDER — ASPIRIN/CALCIUM CARB/MAGNESIUM 324 MG
324 TABLET ORAL ONCE
Refills: 0 | Status: COMPLETED | OUTPATIENT
Start: 2022-06-02 | End: 2022-06-02

## 2022-06-02 RX ADMIN — Medication 324 MILLIGRAM(S): at 23:45

## 2022-06-02 RX ADMIN — Medication 40 MILLIGRAM(S): at 23:46

## 2022-06-02 NOTE — ED ADULT NURSE NOTE - OBJECTIVE STATEMENT
Pt states, "I been having palpitations for 4 days. I feel my heart racing so fast and it is worse when I walk."

## 2022-06-03 ENCOUNTER — INPATIENT (INPATIENT)
Facility: HOSPITAL | Age: 42
LOS: 3 days | Discharge: HOME | DRG: 281 | End: 2022-06-07
Attending: INTERNAL MEDICINE | Admitting: INTERNAL MEDICINE
Payer: MEDICAID

## 2022-06-03 DIAGNOSIS — D50.9 IRON DEFICIENCY ANEMIA, UNSPECIFIED: ICD-10-CM

## 2022-06-03 DIAGNOSIS — C81.90 HODGKIN LYMPHOMA, UNSPECIFIED, UNSPECIFIED SITE: ICD-10-CM

## 2022-06-03 DIAGNOSIS — I10 ESSENTIAL (PRIMARY) HYPERTENSION: ICD-10-CM

## 2022-06-03 DIAGNOSIS — R74.01 ELEVATION OF LEVELS OF LIVER TRANSAMINASE LEVELS: ICD-10-CM

## 2022-06-03 DIAGNOSIS — R06.00 DYSPNEA, UNSPECIFIED: ICD-10-CM

## 2022-06-03 DIAGNOSIS — Z29.9 ENCOUNTER FOR PROPHYLACTIC MEASURES, UNSPECIFIED: ICD-10-CM

## 2022-06-03 DIAGNOSIS — I21.4 NON-ST ELEVATION (NSTEMI) MYOCARDIAL INFARCTION: ICD-10-CM

## 2022-06-03 DIAGNOSIS — C85.90 NON-HODGKIN LYMPHOMA, UNSPECIFIED, UNSPECIFIED SITE: ICD-10-CM

## 2022-06-03 DIAGNOSIS — E03.9 HYPOTHYROIDISM, UNSPECIFIED: ICD-10-CM

## 2022-06-03 DIAGNOSIS — E66.9 OBESITY, UNSPECIFIED: ICD-10-CM

## 2022-06-03 DIAGNOSIS — I50.9 HEART FAILURE, UNSPECIFIED: ICD-10-CM

## 2022-06-03 LAB
A1C WITH ESTIMATED AVERAGE GLUCOSE RESULT: 5.8 % — HIGH (ref 4–5.6)
ALBUMIN SERPL ELPH-MCNC: 3.6 G/DL — SIGNIFICANT CHANGE UP (ref 3.3–5)
ALP SERPL-CCNC: 73 U/L — SIGNIFICANT CHANGE UP (ref 40–120)
ALT FLD-CCNC: 59 U/L — HIGH (ref 10–45)
ANION GAP SERPL CALC-SCNC: 12 MMOL/L — SIGNIFICANT CHANGE UP (ref 5–17)
APTT BLD: 36.3 SEC — HIGH (ref 27.5–35.5)
AST SERPL-CCNC: 49 U/L — HIGH (ref 10–40)
BASOPHILS # BLD AUTO: 0.04 K/UL — SIGNIFICANT CHANGE UP (ref 0–0.2)
BASOPHILS NFR BLD AUTO: 0.5 % — SIGNIFICANT CHANGE UP (ref 0–2)
BILIRUB SERPL-MCNC: 0.2 MG/DL — SIGNIFICANT CHANGE UP (ref 0.2–1.2)
BUN SERPL-MCNC: 12 MG/DL — SIGNIFICANT CHANGE UP (ref 7–23)
CALCIUM SERPL-MCNC: 8.9 MG/DL — SIGNIFICANT CHANGE UP (ref 8.4–10.5)
CHLORIDE SERPL-SCNC: 102 MMOL/L — SIGNIFICANT CHANGE UP (ref 96–108)
CHOLEST SERPL-MCNC: 155 MG/DL — SIGNIFICANT CHANGE UP
CK MB CFR SERPL CALC: 2.3 NG/ML — SIGNIFICANT CHANGE UP (ref 0–6.7)
CK MB CFR SERPL CALC: 2.4 NG/ML — SIGNIFICANT CHANGE UP (ref 0–6.7)
CK SERPL-CCNC: 125 U/L — SIGNIFICANT CHANGE UP (ref 25–170)
CK SERPL-CCNC: 140 U/L — SIGNIFICANT CHANGE UP (ref 25–170)
CO2 SERPL-SCNC: 24 MMOL/L — SIGNIFICANT CHANGE UP (ref 22–31)
CREAT SERPL-MCNC: 0.81 MG/DL — SIGNIFICANT CHANGE UP (ref 0.5–1.3)
D DIMER BLD IA.RAPID-MCNC: 720 NG/ML DDU — HIGH
EGFR: 93 ML/MIN/1.73M2 — SIGNIFICANT CHANGE UP
EOSINOPHIL # BLD AUTO: 0.44 K/UL — SIGNIFICANT CHANGE UP (ref 0–0.5)
EOSINOPHIL NFR BLD AUTO: 5.3 % — SIGNIFICANT CHANGE UP (ref 0–6)
ESTIMATED AVERAGE GLUCOSE: 120 MG/DL — HIGH (ref 68–114)
FERRITIN SERPL-MCNC: 21 NG/ML — SIGNIFICANT CHANGE UP (ref 15–150)
GLUCOSE BLDC GLUCOMTR-MCNC: 106 MG/DL — HIGH (ref 70–99)
GLUCOSE BLDC GLUCOMTR-MCNC: 128 MG/DL — HIGH (ref 70–99)
GLUCOSE BLDC GLUCOMTR-MCNC: 130 MG/DL — HIGH (ref 70–99)
GLUCOSE BLDC GLUCOMTR-MCNC: 133 MG/DL — HIGH (ref 70–99)
GLUCOSE SERPL-MCNC: 124 MG/DL — HIGH (ref 70–99)
HCG SERPL-ACNC: <0 MIU/ML — SIGNIFICANT CHANGE UP
HCG UR QL: NEGATIVE — SIGNIFICANT CHANGE UP
HCT VFR BLD CALC: 27.3 % — LOW (ref 34.5–45)
HDLC SERPL-MCNC: 31 MG/DL — LOW
HGB BLD-MCNC: 8.7 G/DL — LOW (ref 11.5–15.5)
IMM GRANULOCYTES NFR BLD AUTO: 0.4 % — SIGNIFICANT CHANGE UP (ref 0–1.5)
INR BLD: 1.38 — HIGH (ref 0.88–1.16)
IRON SATN MFR SERPL: 26 UG/DL — LOW (ref 30–160)
IRON SATN MFR SERPL: 8 % — LOW (ref 14–50)
LIPID PNL WITH DIRECT LDL SERPL: 106 MG/DL — HIGH
LYMPHOCYTES # BLD AUTO: 2.76 K/UL — SIGNIFICANT CHANGE UP (ref 1–3.3)
LYMPHOCYTES # BLD AUTO: 33.1 % — SIGNIFICANT CHANGE UP (ref 13–44)
MAGNESIUM SERPL-MCNC: 1.8 MG/DL — SIGNIFICANT CHANGE UP (ref 1.6–2.6)
MCHC RBC-ENTMCNC: 24.4 PG — LOW (ref 27–34)
MCHC RBC-ENTMCNC: 31.9 GM/DL — LOW (ref 32–36)
MCV RBC AUTO: 76.7 FL — LOW (ref 80–100)
MONOCYTES # BLD AUTO: 0.47 K/UL — SIGNIFICANT CHANGE UP (ref 0–0.9)
MONOCYTES NFR BLD AUTO: 5.6 % — SIGNIFICANT CHANGE UP (ref 2–14)
NEUTROPHILS # BLD AUTO: 4.61 K/UL — SIGNIFICANT CHANGE UP (ref 1.8–7.4)
NEUTROPHILS NFR BLD AUTO: 55.1 % — SIGNIFICANT CHANGE UP (ref 43–77)
NON HDL CHOLESTEROL: 124 MG/DL — SIGNIFICANT CHANGE UP
NRBC # BLD: 0 /100 WBCS — SIGNIFICANT CHANGE UP (ref 0–0)
NT-PROBNP SERPL-SCNC: 292 PG/ML — SIGNIFICANT CHANGE UP (ref 0–300)
PHOSPHATE SERPL-MCNC: 3.9 MG/DL — SIGNIFICANT CHANGE UP (ref 2.5–4.5)
PLATELET # BLD AUTO: 221 K/UL — SIGNIFICANT CHANGE UP (ref 150–400)
POTASSIUM SERPL-MCNC: 3.2 MMOL/L — LOW (ref 3.5–5.3)
POTASSIUM SERPL-SCNC: 3.2 MMOL/L — LOW (ref 3.5–5.3)
PROT SERPL-MCNC: 7.3 G/DL — SIGNIFICANT CHANGE UP (ref 6–8.3)
PROTHROM AB SERPL-ACNC: 16.5 SEC — HIGH (ref 10.5–13.4)
RBC # BLD: 3.56 M/UL — LOW (ref 3.8–5.2)
RBC # FLD: 16 % — HIGH (ref 10.3–14.5)
SODIUM SERPL-SCNC: 138 MMOL/L — SIGNIFICANT CHANGE UP (ref 135–145)
T4 FREE SERPL-MCNC: 1.13 NG/DL — SIGNIFICANT CHANGE UP (ref 0.93–1.7)
TIBC SERPL-MCNC: 318 UG/DL — SIGNIFICANT CHANGE UP (ref 220–430)
TRANSFERRIN SERPL-MCNC: 267 MG/DL — SIGNIFICANT CHANGE UP (ref 200–360)
TRIGL SERPL-MCNC: 89 MG/DL — SIGNIFICANT CHANGE UP
TROPONIN T SERPL-MCNC: 0.72 NG/ML — CRITICAL HIGH (ref 0–0.01)
TROPONIN T SERPL-MCNC: 0.99 NG/ML — CRITICAL HIGH (ref 0–0.01)
TSH SERPL-MCNC: 16.7 UIU/ML — HIGH (ref 0.27–4.2)
UIBC SERPL-MCNC: 292 UG/DL — SIGNIFICANT CHANGE UP (ref 110–370)
WBC # BLD: 8.35 K/UL — SIGNIFICANT CHANGE UP (ref 3.8–10.5)
WBC # FLD AUTO: 8.35 K/UL — SIGNIFICANT CHANGE UP (ref 3.8–10.5)

## 2022-06-03 PROCEDURE — 93010 ELECTROCARDIOGRAM REPORT: CPT

## 2022-06-03 PROCEDURE — 93306 TTE W/DOPPLER COMPLETE: CPT | Mod: 26

## 2022-06-03 PROCEDURE — 99223 1ST HOSP IP/OBS HIGH 75: CPT

## 2022-06-03 PROCEDURE — 93970 EXTREMITY STUDY: CPT | Mod: 26

## 2022-06-03 PROCEDURE — 75574 CT ANGIO HRT W/3D IMAGE: CPT | Mod: 26

## 2022-06-03 PROCEDURE — 71275 CT ANGIOGRAPHY CHEST: CPT | Mod: 26

## 2022-06-03 PROCEDURE — 71046 X-RAY EXAM CHEST 2 VIEWS: CPT | Mod: 26

## 2022-06-03 RX ORDER — MAGNESIUM OXIDE 400 MG ORAL TABLET 241.3 MG
400 TABLET ORAL ONCE
Refills: 0 | Status: COMPLETED | OUTPATIENT
Start: 2022-06-03 | End: 2022-06-03

## 2022-06-03 RX ORDER — GLUCAGON INJECTION, SOLUTION 0.5 MG/.1ML
1 INJECTION, SOLUTION SUBCUTANEOUS ONCE
Refills: 0 | Status: DISCONTINUED | OUTPATIENT
Start: 2022-06-03 | End: 2022-06-05

## 2022-06-03 RX ORDER — ATORVASTATIN CALCIUM 80 MG/1
40 TABLET, FILM COATED ORAL AT BEDTIME
Refills: 0 | Status: DISCONTINUED | OUTPATIENT
Start: 2022-06-03 | End: 2022-06-03

## 2022-06-03 RX ORDER — CLOPIDOGREL BISULFATE 75 MG/1
300 TABLET, FILM COATED ORAL ONCE
Refills: 0 | Status: COMPLETED | OUTPATIENT
Start: 2022-06-03 | End: 2022-06-03

## 2022-06-03 RX ORDER — POTASSIUM CHLORIDE 20 MEQ
40 PACKET (EA) ORAL ONCE
Refills: 0 | Status: COMPLETED | OUTPATIENT
Start: 2022-06-03 | End: 2022-06-03

## 2022-06-03 RX ORDER — DEXTROSE 50 % IN WATER 50 %
25 SYRINGE (ML) INTRAVENOUS ONCE
Refills: 0 | Status: DISCONTINUED | OUTPATIENT
Start: 2022-06-03 | End: 2022-06-05

## 2022-06-03 RX ORDER — DEXTROSE 50 % IN WATER 50 %
15 SYRINGE (ML) INTRAVENOUS ONCE
Refills: 0 | Status: DISCONTINUED | OUTPATIENT
Start: 2022-06-03 | End: 2022-06-05

## 2022-06-03 RX ORDER — INSULIN LISPRO 100/ML
VIAL (ML) SUBCUTANEOUS
Refills: 0 | Status: DISCONTINUED | OUTPATIENT
Start: 2022-06-03 | End: 2022-06-05

## 2022-06-03 RX ORDER — LISINOPRIL 2.5 MG/1
20 TABLET ORAL DAILY
Refills: 0 | Status: DISCONTINUED | OUTPATIENT
Start: 2022-06-03 | End: 2022-06-06

## 2022-06-03 RX ORDER — ENOXAPARIN SODIUM 100 MG/ML
40 INJECTION SUBCUTANEOUS EVERY 24 HOURS
Refills: 0 | Status: DISCONTINUED | OUTPATIENT
Start: 2022-06-03 | End: 2022-06-03

## 2022-06-03 RX ORDER — FUROSEMIDE 40 MG
40 TABLET ORAL ONCE
Refills: 0 | Status: COMPLETED | OUTPATIENT
Start: 2022-06-03 | End: 2022-06-03

## 2022-06-03 RX ORDER — AMLODIPINE BESYLATE 2.5 MG/1
5 TABLET ORAL DAILY
Refills: 0 | Status: DISCONTINUED | OUTPATIENT
Start: 2022-06-03 | End: 2022-06-07

## 2022-06-03 RX ORDER — SODIUM CHLORIDE 9 MG/ML
1000 INJECTION INTRAMUSCULAR; INTRAVENOUS; SUBCUTANEOUS
Refills: 0 | Status: DISCONTINUED | OUTPATIENT
Start: 2022-06-03 | End: 2022-06-04

## 2022-06-03 RX ORDER — ASPIRIN/CALCIUM CARB/MAGNESIUM 324 MG
81 TABLET ORAL DAILY
Refills: 0 | Status: DISCONTINUED | OUTPATIENT
Start: 2022-06-03 | End: 2022-06-05

## 2022-06-03 RX ORDER — SODIUM CHLORIDE 9 MG/ML
1000 INJECTION, SOLUTION INTRAVENOUS
Refills: 0 | Status: DISCONTINUED | OUTPATIENT
Start: 2022-06-03 | End: 2022-06-05

## 2022-06-03 RX ORDER — CLOPIDOGREL BISULFATE 75 MG/1
75 TABLET, FILM COATED ORAL DAILY
Refills: 0 | Status: DISCONTINUED | OUTPATIENT
Start: 2022-06-04 | End: 2022-06-04

## 2022-06-03 RX ORDER — LEVOTHYROXINE SODIUM 125 MCG
75 TABLET ORAL DAILY
Refills: 0 | Status: DISCONTINUED | OUTPATIENT
Start: 2022-06-03 | End: 2022-06-07

## 2022-06-03 RX ORDER — POTASSIUM CHLORIDE 20 MEQ
40 PACKET (EA) ORAL EVERY 4 HOURS
Refills: 0 | Status: COMPLETED | OUTPATIENT
Start: 2022-06-03 | End: 2022-06-03

## 2022-06-03 RX ORDER — DEXTROSE 50 % IN WATER 50 %
12.5 SYRINGE (ML) INTRAVENOUS ONCE
Refills: 0 | Status: DISCONTINUED | OUTPATIENT
Start: 2022-06-03 | End: 2022-06-05

## 2022-06-03 RX ADMIN — Medication 81 MILLIGRAM(S): at 10:54

## 2022-06-03 RX ADMIN — CLOPIDOGREL BISULFATE 300 MILLIGRAM(S): 75 TABLET, FILM COATED ORAL at 06:11

## 2022-06-03 RX ADMIN — ENOXAPARIN SODIUM 40 MILLIGRAM(S): 100 INJECTION SUBCUTANEOUS at 05:18

## 2022-06-03 RX ADMIN — AMLODIPINE BESYLATE 5 MILLIGRAM(S): 2.5 TABLET ORAL at 05:19

## 2022-06-03 RX ADMIN — Medication 40 MILLIEQUIVALENT(S): at 05:19

## 2022-06-03 RX ADMIN — LISINOPRIL 20 MILLIGRAM(S): 2.5 TABLET ORAL at 05:19

## 2022-06-03 RX ADMIN — MAGNESIUM OXIDE 400 MG ORAL TABLET 400 MILLIGRAM(S): 241.3 TABLET ORAL at 10:54

## 2022-06-03 RX ADMIN — Medication 40 MILLIEQUIVALENT(S): at 10:53

## 2022-06-03 RX ADMIN — Medication 75 MICROGRAM(S): at 05:19

## 2022-06-03 RX ADMIN — Medication 40 MILLIEQUIVALENT(S): at 14:31

## 2022-06-03 RX ADMIN — Medication 40 MILLIGRAM(S): at 10:54

## 2022-06-03 RX ADMIN — CLOPIDOGREL BISULFATE 300 MILLIGRAM(S): 75 TABLET, FILM COATED ORAL at 18:39

## 2022-06-03 RX ADMIN — SODIUM CHLORIDE 50 MILLILITER(S): 9 INJECTION INTRAMUSCULAR; INTRAVENOUS; SUBCUTANEOUS at 18:39

## 2022-06-03 NOTE — DIETITIAN INITIAL EVALUATION ADULT - PERTINENT LABORATORY DATA
06-03    138  |  102  |  12  ----------------------------<  124<H>  3.2<L>   |  24  |  0.81    Ca    8.9      03 Jun 2022 06:52  Phos  3.9     06-03  Mg     1.8     06-03    TPro  7.3  /  Alb  3.6  /  TBili  0.2  /  DBili  x   /  AST  49<H>  /  ALT  59<H>  /  AlkPhos  73  06-03  POCT Blood Glucose.: 128 mg/dL (06-03-22 @ 12:15)  A1C with Estimated Average Glucose Result: 5.8 % (06-03-22 @ 06:52)  Trop T .72

## 2022-06-03 NOTE — H&P ADULT - ASSESSMENT
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 worsening underlying valvular disease.  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

## 2022-06-03 NOTE — ED PROVIDER NOTE - CLINICAL SUMMARY MEDICAL DECISION MAKING FREE TEXT BOX
CHF exacerbation, no CP, trop likely demand ischemia  Awaiting BNP and CXR and likely admit to cards

## 2022-06-03 NOTE — H&P ADULT - NSHPPHYSICALEXAM_GEN_ALL_CORE
VITAL SIGNS:  T(C): 36.7 (06-03-22 @ 02:14), Max: 37.1 (06-02-22 @ 21:00)  T(F): 98 (06-03-22 @ 02:14), Max: 98.8 (06-02-22 @ 21:00)  HR: 59 (06-03-22 @ 02:14) (59 - 74)  BP: 137/78 (06-03-22 @ 02:14) (137/78 - 157/83)  BP(mean): --  RR: 18 (06-03-22 @ 02:14) (18 - 20)  SpO2: 98% (06-03-22 @ 02:14) (98% - 99%)  Wt(kg): --    PHYSICAL EXAM:    Constitutional: WDWN, lying comfortably in bed, NAD  Head: Nc/At  Eyes: PERRL, EOMI, clear conjunctiva  ENT: no nasal discharge; uvula midline, no oropharyngeal erythema or exudates; MMM  Neck: supple; no JVD or thyromegaly  Respiratory: CTA b/l, no wheezes, rales, or rhonchi  Cardiac: +S1/S2, +RRR, no murmurs, rubs, or gallops  Gastrointestinal: soft, non-tender, non-distended, no rebound/guarding, no palpable masses, normoactive bowel sounds x4  Back: spine midline, no bony tenderness or step-offs; no CVAT B/L  Extremities: WWP, no clubbing or cyanosis, no peripheral edema  Musculoskeletal: NROM x4; no joint swelling, tenderness or erythema  Vascular: 2+ radial and DP pulses b/l  Dermatologic: skin warm, dry and intact, no rashes, wounds, or scars  Lymphatic: no submandibular or cervical LAD  Neurologic: AAOx3, CNII-XII grossly intact, no focal deficits  Psychiatric: affect and characteristics of appearance, verbalizations, behaviors are appropriate VITAL SIGNS:  T(C): 36.7 (06-03-22 @ 02:14), Max: 37.1 (06-02-22 @ 21:00)  T(F): 98 (06-03-22 @ 02:14), Max: 98.8 (06-02-22 @ 21:00)  HR: 59 (06-03-22 @ 02:14) (59 - 74)  BP: 137/78 (06-03-22 @ 02:14) (137/78 - 157/83)  BP(mean): --  RR: 18 (06-03-22 @ 02:14) (18 - 20)  SpO2: 98% (06-03-22 @ 02:14) (98% - 99%)  Wt(kg): --    PHYSICAL EXAM:    Constitutional: morbidly obese female. WDWN, lying comfortably in bed, appears short of breath after walking from the bathroom  Head: Nc/At  Eyes: PERRL, EOMI, clear conjunctiva  ENT: no nasal discharge; uvula midline, no oropharyngeal erythema or exudates; MMM  Neck: supple; no JVD or thyromegaly  Respiratory: CTA b/l, no wheezes, rales, or rhonchi  Cardiac: +S1/S2, +RRR,+blowing systolic ejection murmur   Gastrointestinal: soft, non-tender, non-distended, no rebound/guarding, no palpable masses, normoactive bowel sounds x4  Back: spine midline, no bony tenderness or step-offs; no CVAT B/L  Extremities: WWP, no clubbing or cyanosis, 1+ pitting edema to BLE below the knee, RLE appears slightly more edematous than RLE  Musculoskeletal: NROM x4; no joint swelling, tenderness or erythema  Vascular: 2+ radial and DP pulses b/l  Dermatologic: skin warm, dry and intact, no rashes, wounds, or scars  Lymphatic: no submandibular or cervical LAD  Neurologic: AAOx3, CNII-XII grossly intact, no focal deficits  Psychiatric: affect and characteristics of appearance, verbalizations, behaviors are appropriate

## 2022-06-03 NOTE — CHART NOTE - NSCHARTNOTEFT_GEN_A_CORE
Pt seen and examined today, found to have peaked Trop 0.99 and DDimer 720, EKG w/ inferolateral TWi. S/p CCTA and CT Chest PE protocol revealed NO PE, and ostial/prox RCA w/ occluded 2/2 calcified and noncalcified plaque. Discussed with cardiology attending Dr Pimentel and interventional attending Dr Leigh, plan for cardiac cath in AM given contrast received for CTAs, concern for NSTEMI. S/p /Plavix 600mg load today. No Heparin gtt per Dr Leigh given chronic anemia (since childhood w/ Hgb baseline 7-9 per patient, denies s/s bleeding). Will give gentle IVF hydration NS 50cc/hr x 12hr given EF 45-50% on echo, euvolemic on exam. Pt continues to deny chest pain, only symptomatic w/ BRASWELL w/ minimal exertion.     ICU Vital Signs Last 24 Hrs  T(C): 36.8 (03 Jun 2022 13:44), Max: 37.1 (02 Jun 2022 21:00)  T(F): 98.2 (03 Jun 2022 13:44), Max: 98.8 (02 Jun 2022 21:00)  HR: 54 (03 Jun 2022 16:38) (54 - 74)  BP: 139/78 (03 Jun 2022 16:38) (127/59 - 162/72)  BP(mean): 93 (03 Jun 2022 10:53) (85 - 93)  RR: 15 (03 Jun 2022 16:38) (15 - 20)  SpO2: 97% (03 Jun 2022 16:38) (97% - 100%)

## 2022-06-03 NOTE — H&P ADULT - PROBLEM SELECTOR PLAN 6
pt previously on Synthroid 75mcg, TSH today 16.7   - restart Synthroid 75mcg in AM  - recommend follow up with Endocrine as outpatient

## 2022-06-03 NOTE — H&P ADULT - PROBLEM SELECTOR PLAN 4
AST and ALT slightly elevated to 47 and 60 respectively. DDx chf exacerbation and congestive hepatopathy, recent NSTEMI vs. underlying CONDON   - trend CMP  - hold hepatotoxic agents

## 2022-06-03 NOTE — ED PROVIDER NOTE - NSICDXPASTMEDICALHX_GEN_ALL_CORE_FT
PAST MEDICAL HISTORY:  Dyspnea and respiratory abnormality Dyspnea on exertion    Essential hypertension Hypertension    Hodgkin's disease Hodgkin lymphoma    Hypothyroidism Hypothyroid

## 2022-06-03 NOTE — H&P ADULT - NSHPSOCIALHISTORY_GEN_ALL_CORE
Tobacco use:   EtOH use:  Illicit drug use:    Living situation: Tobacco use:  Denies   EtOH use: Social use, less than twice a week, up to 2 drinks   Illicit drug use: denies     Living situation: Works for [employer], lives at home alone, fully independent of all ADLs

## 2022-06-03 NOTE — H&P ADULT - NSICDXFAMILYHX_GEN_ALL_CORE_FT
FAMILY HISTORY:  Mother  Still living? Unknown  Family history of cardiovascular disease, Age at diagnosis: Age Unknown

## 2022-06-03 NOTE — H&P ADULT - PROBLEM SELECTOR PLAN 2
Pt with trop of 0.94 --> 0.99, CK-MB within jessy, currently without any chest pain. Likely 2/2 demand ischemia in the setting of chf exacerbation. EKG with NSR with Q waves in the inferior leads (seen on prior EKG), but now with TWI in the V4-V6. Received ASA 324mg PO x 1 dose in ED.  - trend trop to peak  -will continue ASA,   - holding statin in setting of transaminitis   - plan for possible ischemic evaluation Pt with trop of 0.94 --> 0.99, CK-MB within jessy, currently without any chest pain. Likely 2/2 demand ischemia in the setting of chf exacerbation. EKG with NSR with Q waves in the inferior leads (seen on prior EKG), but now with TWI in the V4-V6. Received ASA 324mg PO x 1 dose in ED.  - plavix load and start 75 mg of plavix   - trend trop to peak  - will continue ASA 81mg qd  - holding statin in setting of transaminitis   - plan for possible ischemic evaluation

## 2022-06-03 NOTE — H&P ADULT - NSHPREVIEWOFSYSTEMS_GEN_ALL_CORE
REVIEW OF SYSTEMS:    CONSTITUTIONAL: Patient denies weakness, fevers or chills  EYES/ENT: Patient denies visual changes;  denies vertigo or throat pain   NECK: Patient denies pain or stiffness  RESPIRATORY: Patient denies cough, wheezing, hemoptysis; denies shortness of breath  CARDIOVASCULAR: Patient denies chest pain or palpitations  GASTROINTESTINAL: Patient denies abdominal or epigastric pain, nausea, vomiting, or hematemesis, diarrhea or constipation, melena or hematochezia.  GENITOURINARY: Patient denies dysuria, frequency or hematuria  NEUROLOGICAL: Patient denies numbness or weakness  SKIN: Patient denies itching, burning, rashes, or lesions   All other review of systems is negative unless indicated above. REVIEW OF SYSTEMS:    CONSTITUTIONAL: Patient denies weakness, fevers or chills  EYES/ENT: Patient denies visual changes;  denies vertigo or throat pain   NECK: Patient denies pain or stiffness  RESPIRATORY: Patient denies cough, wheezing, hemoptysis; +shortness of breath  CARDIOVASCULAR: Patient denies chest pain, + palpitations, shortness of breath  GASTROINTESTINAL: Patient denies abdominal or epigastric pain, nausea, vomiting, or hematemesis, diarrhea or constipation, melena or hematochezia.  GENITOURINARY: Patient denies dysuria, frequency or hematuria  NEUROLOGICAL: Patient denies numbness or weakness  SKIN: Patient denies itching, burning, rashes, or lesions   All other review of systems is negative unless indicated above.

## 2022-06-03 NOTE — H&P ADULT - PROBLEM SELECTOR PLAN 3
Pt with h/o pre-eclampsia, and long standing HTN, on arrival, was hypertensive with SBP 160s   - resume Amlodipine 5mg   - resume Lisinopril 20 mg qd  - hold statin in the setting of transaminitis

## 2022-06-03 NOTE — ED ADULT NURSE REASSESSMENT NOTE - NS ED NURSE REASSESS COMMENT FT1
Pt resting comfortably in no distress, breathing with ease on room air. Denies any active chest pain, sob, palpitations or dizziness, asymptomatic at this time. Repeat labs sent, pending results and admission to tele. Pt denies any other c/o. Will monitor.

## 2022-06-03 NOTE — H&P ADULT - NSHPLABSRESULTS_GEN_ALL_CORE
9.4    9.40  )-----------( 248      ( 02 Jun 2022 21:31 )             29.5       06-02    140  |  104  |  13  ----------------------------<  108<H>  3.6   |  24  |  0.83    Ca    9.4      02 Jun 2022 21:31    TPro  7.9  /  Alb  4.0  /  TBili  0.2  /  DBili  x   /  AST  47<H>  /  ALT  60<H>  /  AlkPhos  82  06-02                  PT/INR - ( 02 Jun 2022 21:31 )   PT: 16.3 sec;   INR: 1.37          PTT - ( 02 Jun 2022 21:31 )  PTT:35.0 sec    Lactate Trend      CARDIAC MARKERS ( 03 Jun 2022 00:50 )  x     / 0.99 ng/mL / x     / x     / x      CARDIAC MARKERS ( 02 Jun 2022 21:31 )  x     / 0.94 ng/mL / x     / x     / x            CAPILLARY BLOOD GLUCOSE 9.4    9.40  )-----------( 248      ( 02 Jun 2022 21:31 )             29.5       06-02    140  |  104  |  13  ----------------------------<  108<H>  3.6   |  24  |  0.83    Ca    9.4      02 Jun 2022 21:31    TPro  7.9  /  Alb  4.0  /  TBili  0.2  /  DBili  x   /  AST  47<H>  /  ALT  60<H>  /  AlkPhos  82  06-02          PT/INR - ( 02 Jun 2022 21:31 )   PT: 16.3 sec;   INR: 1.37          PTT - ( 02 Jun 2022 21:31 )  PTT:35.0 sec    Lactate Trend      CARDIAC MARKERS ( 03 Jun 2022 00:50 )  x     / 0.99 ng/mL / 140 U/L / x     / 2.4 ng/mL  CARDIAC MARKERS ( 02 Jun 2022 21:31 )  x     / 0.94 ng/mL / x     / x     / x          CAPILLARY BLOOD GLUCOSE

## 2022-06-03 NOTE — DIETITIAN INITIAL EVALUATION ADULT - OTHER INFO
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 Admitted for possible CHF exacerbation in the setting of NSTEMI.     Pt seen on 5UR. Breakfast 25% consumed this AM. Reports varying PO intalke PTA, does not have to do with lack of appetite rather feels she forgets to eat. Often will have coffee in AM and then not eat again until later - of note coffee may be acting as appetite suppressant. Feels she sometimes Skip meals d/t work schedule. When she does eat often has high CHO intake (rice, muffins, croissants). Does also often order in meals, get fast food. Noted preDM hx, metformin per H&P. Unsure of last A1c, 5.8% o/a. Other labs of note: HDL 31, . UBW not stated, takes wts x1/week. Reports wt can varying within a 20 pound range, feels in part may be d/t how she eats and in part may be d/t fluid shifts. 1+BL leg edema noted at this time. BM+ 6/1. No pain. Gaudencio 20. No pressure ulcer.   Please see below for nutritions recommendations.

## 2022-06-03 NOTE — H&P ADULT - PROBLEM SELECTOR PLAN 7
lifestyle modifications  - f/u a1c, lipid panel    #History of pre-diabetes, non-adherent to Metformin at home due to side effects (stomach cramps)  - f/u a1c  - low dose insulin sliding scale

## 2022-06-03 NOTE — DIETITIAN INITIAL EVALUATION ADULT - ADD RECOMMEND
1. Monitor PO intake/appetite, GI distress, diet tolerance, labs, weights.  2. Honor pt food preferences as able.  3. RD to remain available for additional nutrition interventions as needed.  ** High Nutrition Risk.

## 2022-06-03 NOTE — ED PROVIDER NOTE - NSICDXPASTSURGICALHX_GEN_ALL_CORE_FT
PAST SURGICAL HISTORY:  Other postprocedural status S/P lymph node biopsy    Other postprocedural status S/P  section

## 2022-06-03 NOTE — PROGRESS NOTE ADULT - SUBJECTIVE AND OBJECTIVE BOX
Precath Checklist      Allergies:  No Known Allergies      Shellfish/Contrast Allergies? No       PULSES:	   B	            R	                FEM         	                                 DP/PT  Right	   2+	           2+                       No     Bruit	                                 2+/2+  Left	  2+	           2+                      No     Bruit	                                 2+/2+                                8.7    8.35  )-----------( 221      ( 03 Jun 2022 06:52 )             27.3     06-03    138  |  102  |  12  ----------------------------<  124<H>  3.2<L>   |  24  |  0.81    Ca    8.9      03 Jun 2022 06:52  Phos  3.9     06-03  Mg     1.8     06-03    TPro  7.3  /  Alb  3.6  /  TBili  0.2  /  DBili  x   /  AST  49<H>  /  ALT  59<H>  /  AlkPhos  73  06-03    CARDIAC MARKERS ( 03 Jun 2022 06:52 )  x     / 0.72 ng/mL / 125 U/L / x     / 2.3 ng/mL  CARDIAC MARKERS ( 03 Jun 2022 00:50 )  x     / 0.99 ng/mL / 140 U/L / x     / 2.4 ng/mL  CARDIAC MARKERS ( 02 Jun 2022 21:31 )  x     / 0.94 ng/mL / x     / x     / x          Prothrombin Time, Plasma: 16.5 sec [10.5 - 13.4] (06-03-22 @ 06:52)  INR: 1.38 [0.88 - 1.16] (06-03-22 @ 06:52)  Activated Partial Thromboplastin Time: 36.3 sec [27.5 - 35.5] (06-03-22 @ 06:52)  Prothrombin Time, Plasma: 16.3 sec [10.5 - 13.4] (06-02-22 @ 21:31)  INR: 1.37 [0.88 - 1.16] (06-02-22 @ 21:31)  Activated Partial Thromboplastin Time: 35.0 sec [27.5 - 35.5] (06-02-22 @ 21:31)      COVID Result within 48-72hours:   COVID-19 PCR: Negative (06-02-22 @ 21:31)      COVID VACCINATED: YES, 5/2022    HCG results: negative  HCG Quantitative, Serum: <0 mIU/mL (06-03-22 @ 11:05)        EKG:  	    ASA _II____				Mallampati class: ___IV______	            Anginal Class: ___II______      Sedation Plan:   [  ] None   [ x ] Moderate   [  ]  Deep    [  ]  General Anesthesia   Patient Is Suitable Candidate For Sedation?     [x  ] Yes   [  ] No   [  ] Not Applicable   Cath Order Entered: [ x ] Yes  DAPT LOAD Ordered: [ x ] Yes  [  ] No load 2/2 ________  Pre-Cath fluids Ordered: [x  ] Yes  [  ] Not indicated 2/2 _________    Risks Benefits Annotation:     CARDIAC CATH: Risks & benefits of procedure and sedation and risks and benefits for the alternative therapy have been explained to the patient and/or HCP in layman’s terms including but not limited to: allergic reaction, bleeding, infection, arrhythmia, respiratory compromise, renal and vascular compromise, limb damage, MI, CVA, emergent CABG/Vascular Surgery and death. Informed consent obtained and in chart.

## 2022-06-03 NOTE — PATIENT PROFILE ADULT - FALL HARM RISK - HARM RISK INTERVENTIONS

## 2022-06-03 NOTE — H&P ADULT - PROBLEM SELECTOR PLAN 5
Hb 9.4, MCV 77, baseline appears to range from 10.2-11.3. No recent symptoms of bleed  - f/u iron panel  - keep active type and screen  - keep Hb >7

## 2022-06-03 NOTE — DIETITIAN INITIAL EVALUATION ADULT - OTHER CALCULATIONS
5'6''  pounds +-10%  Wt 207 pounds, BMI 33.7, %[id number]  IBW used to calculate energy needs due to pt's current body weight exceeding 120% of IBW  Adjusted for age, CHF, ?Wt loss; Fluids per team

## 2022-06-03 NOTE — H&P ADULT - PROBLEM SELECTOR PLAN 1
Pt presenting with progressive dyspnea on exertion, LE swelling. DDx does include include CHF exacerbation, however patient reports only minimal relief after receiving lasix in the emergency room, , no JVD or crackles on exam, CXR with very minimal pulmonary vascular congestion, and BNP only 292. last echo from 2020 with EF 45%, mild AS.  Echo from [facility] with mildly reduced LV systolic function and mild LV hypokinesis, thickened mitral valve with moderate MR and moderate aortic eccentric insufficiency aortic sclerosis and without stenosis, also with normal EKG stress test. Also had an Cardiac MRI which confirmed the above. Other ddx includes underlying valvular disease, blowing murmur on exam, pulmonary hypertension, as well as PE but is probably less likely given Wells score of 0  - f/u official TTE  - I/Os, daily weights  - will dose for an additional dose of Lasix 40mg in the AM and reassess symptoms  - F/u US dopplers lower extremities Pt presenting with progressive dyspnea on exertion, LE swelling. DDx includes recent NSTEMI, possible CHF exacerbation, however patient reports only minimal relief after receiving lasix in the emergency room, , no JVD or crackles on exam, CXR with very minimal pulmonary vascular congestion, and BNP only 292. last echo from 2020 with EF 45%, mild AS.  Echo from [facility] with mildly reduced LV systolic function and mild LV hypokinesis, thickened mitral valve with moderate MR and moderate aortic eccentric insufficiency aortic sclerosis and without stenosis, also with normal EKG stress test. Also had an Cardiac MRI which confirmed the above. Other ddx includes underlying valvular disease, blowing murmur on exam, pulmonary hypertension, as well as PE but is probably less likely given Wells score of 0  - f/u official TTE  - I/Os, daily weights  - will dose for an additional dose of Lasix 40mg in the AM and reassess symptoms  - F/u US dopplers lower extremities

## 2022-06-03 NOTE — H&P ADULT - HISTORY OF PRESENT ILLNESS
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     In the ED: Initial vital signs: T: 98.8F, HR: 74 BP: 157/83 mmHg R: 12, SpO2: 99 on RA  ED course: s/p 324 mg aspirin and 40mg IV lasix   Labs: significant for Trop T 0.94 --> 0.99 , H/H 9.4/29.5, AST 47 ALT 60,    Imaging:  CXR: mild pulmonary congestion, mild cardiomegaly present, no obvious infiltrates or effusions  EKG: NSR, Q waves in inferior leads, TWI in V3-V6, biphasic in V1 and V2, no ST elevations   Medications: amLODIPine 5 MG 1 tab qd, Atorvastatin Calcium 20 MG Oral Tablet, Levothyroxine Sodium 75 MCG Oral Tablet 1 tab qd, Lisinopril 20 MG Oral Tablet; TAKE 1 TABLET DAILY, metFORMIN HCl - 1000 1 tab qd, Torsemide 5 MG Oral Tablet; one tablet daily  Consults: none  41F with history of post-partum cardiomyopathy (diagnosed in 2001, last 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 She noticed that her shortness of breath was worse than baseline on Sunday and noticed she had to stop to catch her breath from just walking from her room to the bathroom. Normally she reports adherence to her diuretic and has not missed any doses, In fact, she took 1 extra dose of her torsemide 5mg yesterday and did not provide added relief. She states this does feel like her usual symptoms of heart failure exacerbation and water retention. She reports associated palpitations with the shortness of breath and orthopnea, but denies any chest pain at rest or exertion, denies any fevers, chills, cough, abdominal pain, n/v/d, dysuria, hematuria, or any other complaints at this time.      Of note, patient has not had a cardiac cath in the past, but did have a     In the ED: Initial vital signs: T: 98.8F, HR: 74 BP: 157/83 mmHg R: 12, SpO2: 99 on RA  ED course: s/p 324 mg aspirin and 40mg IV lasix   Labs: significant for Trop T 0.94 --> 0.99 , H/H 9.4/29.5, AST 47 ALT 60,    Imaging:  CXR: mild pulmonary congestion, mild cardiomegaly present, no obvious infiltrates or effusions  EKG: NSR, Q waves in inferior leads, TWI in V3-V6, biphasic in V1 and V2, no ST elevations   Medications: amLODIPine 5 MG 1 tab qd,  Lisinopril 20 MG 1 tab qd  Torsemide 5 MG 1 tab qd  Atorvastatin Calcium 20 MG 1 tab qd (non-adherent)  Levothyroxine Sodium 75 MCG  1 tab qd (non-adherent)   metFORMIN HCl - 1000 1 tab qd (Non-adherent)    Consults: none  41F with history of post-partum cardiomyopathy (diagnosed in 2001, last 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 She noticed that her shortness of breath was worse than baseline on Sunday and noticed she had to stop to catch her breath from just walking from her room to the bathroom. Normally she reports adherence to her diuretic and has not missed any doses, In fact, she took 1 extra dose of her torsemide 5mg yesterday and did not provide added relief. She states this does feel like her usual symptoms of heart failure exacerbation and water retention. She reports associated palpitations with the shortness of breath and orthopnea, but denies any chest pain at rest or exertion, denies any fevers, chills, cough, abdominal pain, n/v/d, dysuria, hematuria, or any other complaints at this time.      Of note, patient has not had a cardiac cath in the past, but did have a     In the ED: Initial vital signs: T: 98.8F, HR: 74 BP: 157/83 mmHg R: 12, SpO2: 99 on RA  ED course: s/p 324 mg aspirin and 40mg IV lasix   Labs: significant for Trop T 0.94 --> 0.99 , H/H 9.4/29.5, AST 47 ALT 60,    Imaging: CXR: mild pulmonary congestion, stable cardiomegaly present, no obvious infiltrates or effusions   EKG: NSR, Q waves in inferior leads, TWI in V3-V6, biphasic in V1 and V2, no ST elevations   Medications: amLODIPine 5 MG 1 tab qd,  Lisinopril 20 MG 1 tab qd  Torsemide 5 MG 1 tab qd  Atorvastatin Calcium 20 MG 1 tab qd (non-adherent)  Levothyroxine Sodium 75 MCG  1 tab qd (non-adherent)   metFORMIN HCl - 1000 1 tab qd (Non-adherent)    Consults: none

## 2022-06-03 NOTE — DIETITIAN INITIAL EVALUATION ADULT - PERTINENT MEDS FT
MEDICATIONS  (STANDING):  amLODIPine   Tablet 5 milliGRAM(s) Oral daily  aspirin  chewable 81 milliGRAM(s) Oral daily  dextrose 5%. 1000 milliLiter(s) (50 mL/Hr) IV Continuous <Continuous>  dextrose 5%. 1000 milliLiter(s) (100 mL/Hr) IV Continuous <Continuous>  dextrose 50% Injectable 25 Gram(s) IV Push once  dextrose 50% Injectable 12.5 Gram(s) IV Push once  dextrose 50% Injectable 25 Gram(s) IV Push once  enoxaparin Injectable 40 milliGRAM(s) SubCutaneous every 24 hours  glucagon  Injectable 1 milliGRAM(s) IntraMuscular once  insulin lispro (ADMELOG) corrective regimen sliding scale   SubCutaneous Before meals and at bedtime  levothyroxine 75 MICROGram(s) Oral daily  lisinopril 20 milliGRAM(s) Oral daily  potassium chloride    Tablet ER 40 milliEquivalent(s) Oral every 4 hours    MEDICATIONS  (PRN):  dextrose Oral Gel 15 Gram(s) Oral once PRN Blood Glucose LESS THAN 70 milliGRAM(s)/deciliter

## 2022-06-03 NOTE — ED PROVIDER NOTE - OBJECTIVE STATEMENT
42 yof with history of post partum CM preeclampsia NHL chemo radiation EF 43% MIld AS and AI Bleomycin lung toxicity here for SOB.  pt states she has had CHF exacerbations in the past and she feels like she is swollen.    echo 2020 mild AS mild AI EF 45%    Current Meds   · amLODIPine Besylate 5 MG Oral Tablet; TAKE 1 TABLET DAILY FOR BLOOD PRESSURE   · Atorvastatin Calcium 20 MG Oral Tablet; TAKE 1 TABLET BY MOUTH EVERY DAY   · Levothyroxine Sodium 75 MCG Oral Tablet; TAKE 1 TABLET DAILY   · Levothyroxine Sodium 75 MCG Oral Tablet; TAKE 1 TABLET DAILY   · Lisinopril 20 MG Oral Tablet; TAKE 1 TABLET DAILY   · metFORMIN HCl - 1000 MG Oral Tablet; TAKE 1 TABLET DAILY WITH FOOD   · Torsemide 5 MG Oral Tablet; one tablet daily 42 yof with history of post partum CM preeclampsia NHL chemo radiation EF 43% Mild AS and AI Bleomycin lung toxicity here for SOB.  pt states she has had CHF exacerbations in the past and she feels like she is swollen.    echo 2020 mild AS mild AI EF 45%    Current Meds   · amLODIPine Besylate 5 MG Oral Tablet; TAKE 1 TABLET DAILY FOR BLOOD PRESSURE   · Atorvastatin Calcium 20 MG Oral Tablet; TAKE 1 TABLET BY MOUTH EVERY DAY   · Levothyroxine Sodium 75 MCG Oral Tablet; TAKE 1 TABLET DAILY   · Levothyroxine Sodium 75 MCG Oral Tablet; TAKE 1 TABLET DAILY   · Lisinopril 20 MG Oral Tablet; TAKE 1 TABLET DAILY   · metFORMIN HCl - 1000 MG Oral Tablet; TAKE 1 TABLET DAILY WITH FOOD   · Torsemide 5 MG Oral Tablet; one tablet daily

## 2022-06-04 DIAGNOSIS — J18.9 PNEUMONIA, UNSPECIFIED ORGANISM: ICD-10-CM

## 2022-06-04 DIAGNOSIS — I07.1 RHEUMATIC TRICUSPID INSUFFICIENCY: ICD-10-CM

## 2022-06-04 LAB
ALBUMIN SERPL ELPH-MCNC: 3.5 G/DL — SIGNIFICANT CHANGE UP (ref 3.3–5)
ALP SERPL-CCNC: 74 U/L — SIGNIFICANT CHANGE UP (ref 40–120)
ALT FLD-CCNC: 52 U/L — HIGH (ref 10–45)
ANION GAP SERPL CALC-SCNC: 12 MMOL/L — SIGNIFICANT CHANGE UP (ref 5–17)
APTT BLD: 33.8 SEC — SIGNIFICANT CHANGE UP (ref 27.5–35.5)
AST SERPL-CCNC: 37 U/L — SIGNIFICANT CHANGE UP (ref 10–40)
BILIRUB SERPL-MCNC: 0.4 MG/DL — SIGNIFICANT CHANGE UP (ref 0.2–1.2)
BUN SERPL-MCNC: 12 MG/DL — SIGNIFICANT CHANGE UP (ref 7–23)
CALCIUM SERPL-MCNC: 9.1 MG/DL — SIGNIFICANT CHANGE UP (ref 8.4–10.5)
CHLORIDE SERPL-SCNC: 105 MMOL/L — SIGNIFICANT CHANGE UP (ref 96–108)
CO2 SERPL-SCNC: 21 MMOL/L — LOW (ref 22–31)
CREAT SERPL-MCNC: 0.77 MG/DL — SIGNIFICANT CHANGE UP (ref 0.5–1.3)
EGFR: 99 ML/MIN/1.73M2 — SIGNIFICANT CHANGE UP
GLUCOSE BLDC GLUCOMTR-MCNC: 104 MG/DL — HIGH (ref 70–99)
GLUCOSE BLDC GLUCOMTR-MCNC: 107 MG/DL — HIGH (ref 70–99)
GLUCOSE BLDC GLUCOMTR-MCNC: 125 MG/DL — HIGH (ref 70–99)
GLUCOSE BLDC GLUCOMTR-MCNC: 98 MG/DL — SIGNIFICANT CHANGE UP (ref 70–99)
GLUCOSE SERPL-MCNC: 104 MG/DL — HIGH (ref 70–99)
HCT VFR BLD CALC: 29.7 % — LOW (ref 34.5–45)
HGB BLD-MCNC: 9.4 G/DL — LOW (ref 11.5–15.5)
INR BLD: 1.31 — HIGH (ref 0.88–1.16)
MAGNESIUM SERPL-MCNC: 2.1 MG/DL — SIGNIFICANT CHANGE UP (ref 1.6–2.6)
MCHC RBC-ENTMCNC: 24.7 PG — LOW (ref 27–34)
MCHC RBC-ENTMCNC: 31.6 GM/DL — LOW (ref 32–36)
MCV RBC AUTO: 78.2 FL — LOW (ref 80–100)
NRBC # BLD: 0 /100 WBCS — SIGNIFICANT CHANGE UP (ref 0–0)
PLATELET # BLD AUTO: 258 K/UL — SIGNIFICANT CHANGE UP (ref 150–400)
POTASSIUM SERPL-MCNC: 4.4 MMOL/L — SIGNIFICANT CHANGE UP (ref 3.5–5.3)
POTASSIUM SERPL-SCNC: 4.4 MMOL/L — SIGNIFICANT CHANGE UP (ref 3.5–5.3)
PROT SERPL-MCNC: 7.7 G/DL — SIGNIFICANT CHANGE UP (ref 6–8.3)
PROTHROM AB SERPL-ACNC: 15.6 SEC — HIGH (ref 10.5–13.4)
RBC # BLD: 3.8 M/UL — SIGNIFICANT CHANGE UP (ref 3.8–5.2)
RBC # FLD: 16.1 % — HIGH (ref 10.3–14.5)
SODIUM SERPL-SCNC: 138 MMOL/L — SIGNIFICANT CHANGE UP (ref 135–145)
T3 SERPL-MCNC: 97 NG/DL — SIGNIFICANT CHANGE UP (ref 80–200)
T3FREE SERPL-MCNC: 2.86 PG/ML — SIGNIFICANT CHANGE UP (ref 1.8–4.6)
WBC # BLD: 7.2 K/UL — SIGNIFICANT CHANGE UP (ref 3.8–10.5)
WBC # FLD AUTO: 7.2 K/UL — SIGNIFICANT CHANGE UP (ref 3.8–10.5)

## 2022-06-04 PROCEDURE — 99222 1ST HOSP IP/OBS MODERATE 55: CPT

## 2022-06-04 PROCEDURE — 93010 ELECTROCARDIOGRAM REPORT: CPT

## 2022-06-04 PROCEDURE — 99152 MOD SED SAME PHYS/QHP 5/>YRS: CPT

## 2022-06-04 PROCEDURE — 99223 1ST HOSP IP/OBS HIGH 75: CPT | Mod: GC

## 2022-06-04 PROCEDURE — 93458 L HRT ARTERY/VENTRICLE ANGIO: CPT | Mod: 26

## 2022-06-04 PROCEDURE — 99223 1ST HOSP IP/OBS HIGH 75: CPT

## 2022-06-04 RX ORDER — ENOXAPARIN SODIUM 100 MG/ML
40 INJECTION SUBCUTANEOUS EVERY 24 HOURS
Refills: 0 | Status: DISCONTINUED | OUTPATIENT
Start: 2022-06-05 | End: 2022-06-05

## 2022-06-04 RX ORDER — ACETAMINOPHEN 500 MG
650 TABLET ORAL ONCE
Refills: 0 | Status: COMPLETED | OUTPATIENT
Start: 2022-06-04 | End: 2022-06-04

## 2022-06-04 RX ORDER — ATORVASTATIN CALCIUM 80 MG/1
80 TABLET, FILM COATED ORAL AT BEDTIME
Refills: 0 | Status: DISCONTINUED | OUTPATIENT
Start: 2022-06-04 | End: 2022-06-04

## 2022-06-04 RX ORDER — FERROUS SULFATE 325(65) MG
325 TABLET ORAL DAILY
Refills: 0 | Status: DISCONTINUED | OUTPATIENT
Start: 2022-06-04 | End: 2022-06-07

## 2022-06-04 RX ORDER — ATORVASTATIN CALCIUM 80 MG/1
40 TABLET, FILM COATED ORAL AT BEDTIME
Refills: 0 | Status: DISCONTINUED | OUTPATIENT
Start: 2022-06-04 | End: 2022-06-07

## 2022-06-04 RX ORDER — SODIUM CHLORIDE 9 MG/ML
1000 INJECTION INTRAMUSCULAR; INTRAVENOUS; SUBCUTANEOUS
Refills: 0 | Status: DISCONTINUED | OUTPATIENT
Start: 2022-06-04 | End: 2022-06-05

## 2022-06-04 RX ADMIN — Medication 650 MILLIGRAM(S): at 14:53

## 2022-06-04 RX ADMIN — SODIUM CHLORIDE 50 MILLILITER(S): 9 INJECTION INTRAMUSCULAR; INTRAVENOUS; SUBCUTANEOUS at 10:01

## 2022-06-04 RX ADMIN — CLOPIDOGREL BISULFATE 75 MILLIGRAM(S): 75 TABLET, FILM COATED ORAL at 05:51

## 2022-06-04 RX ADMIN — Medication 650 MILLIGRAM(S): at 15:55

## 2022-06-04 RX ADMIN — Medication 81 MILLIGRAM(S): at 05:51

## 2022-06-04 RX ADMIN — LISINOPRIL 20 MILLIGRAM(S): 2.5 TABLET ORAL at 05:51

## 2022-06-04 RX ADMIN — AMLODIPINE BESYLATE 5 MILLIGRAM(S): 2.5 TABLET ORAL at 05:51

## 2022-06-04 RX ADMIN — Medication 325 MILLIGRAM(S): at 18:51

## 2022-06-04 RX ADMIN — ATORVASTATIN CALCIUM 40 MILLIGRAM(S): 80 TABLET, FILM COATED ORAL at 22:09

## 2022-06-04 RX ADMIN — Medication 75 MICROGRAM(S): at 05:51

## 2022-06-04 NOTE — PROGRESS NOTE ADULT - PROBLEM SELECTOR PLAN 5
Hgb 9.4, MCV 77. Pt reports chronic anemia since childhood, Hgb baseline appears to range from 9-11.3. No recent symptoms of bleed.  -Takes iron supplements orally  - F/u Iron panel   - keep active T&S  - keep Hb >7

## 2022-06-04 NOTE — PROGRESS NOTE ADULT - PROBLEM SELECTOR PLAN 3
Pt with h/o pre-eclampsia, and long standing HTN, on arrival, was hypertensive with SBP 160s  - SBP currently 110-140s  - c/w home Amlodipine 5mg qd and Lisinopril 20 mg qd

## 2022-06-04 NOTE — PROVIDER CONTACT NOTE (OTHER) - ACTION/TREATMENT ORDERED:
NP Byers made aware, no intervention required at this time as patient is bradycardic at baseline and asymptomatic.

## 2022-06-04 NOTE — PROGRESS NOTE ADULT - PROBLEM SELECTOR PLAN 1
Pt presenting with progressive dyspnea w/ minimal exertion, palpitations, and michael LE swelling x 1 week. DDx includes recent NSTEMI, possible CHF exacerbation, underlying valvular disease, pulmonary hypertension.  - DDimer 720. , euvolemic on exam  - S/p gentle diuresis w/ Lasix 40mg IV x 2 doses on admit  - Outpatient Echo 2020: EF 45-50%, mild conc LVH, mild AS/AI, mild-mod MR/TR.  - ECHO 6/3: EF 45-50%, mildly dilated LV, dilated RV, PRINCE, mild-mod AI, mild AS, mod MR, severe TR, trivial pericardial effusion.   - LE dopplers negative for DVT  - CT Chest PE protocol: negative for PE. Mild biapical pneumonitis  - Pulm consulted for ? pulm etiology contributing to symptoms given mild apical pneumonitis noted on CT chest. F/u recs  - Structural Heart consulted for new findings of severe TR and mod MR on echo

## 2022-06-04 NOTE — PROGRESS NOTE ADULT - ASSESSMENT
41F with history of post-partum cardiomyopathy (diagnosed in 2001, echo with EF 45-50%), pre-eclampsia, NHL s/p chemo/XRT treatment ~15yrs ago with bleomycin now in remission complicated by pulmonary toxicity, hypertension, pre-diabetes, mild AS, hypothyroidism, presents with SOB w/ minimal exertion and LE swelling over x 1 week. Trop peaked 0.99, DDimer 720. Admitted to cardiac tele for possible CHF exacerbation in the setting of NSTEMI. S/p diagnostic cath 6/4 revealed RCA  w/ L-R collaterals. CT PE protocol negative, noting mild pneumonitis. SHD consulted for mod MR and severe TR. Pulm consulted for ?pulm pathology contributing to BRASWELL.

## 2022-06-04 NOTE — CONSULT NOTE ADULT - SUBJECTIVE AND OBJECTIVE BOX
Surgeon:    Requesting Physician:    HISTORY OF PRESENT ILLNESS (Need 4):  41y Female    PAST MEDICAL & SURGICAL HISTORY:  Dyspnea and respiratory abnormality  Dyspnea on exertion      Hypothyroidism  Hypothyroid      Essential hypertension  Hypertension      Hodgkin&#x27;s disease  Hodgkin lymphoma      Other postprocedural status  S/P lymph node biopsy      Other postprocedural status  S/P  section          MEDICATIONS  (STANDING):  amLODIPine   Tablet 5 milliGRAM(s) Oral daily  aspirin  chewable 81 milliGRAM(s) Oral daily  atorvastatin 40 milliGRAM(s) Oral at bedtime  dextrose 5%. 1000 milliLiter(s) (50 mL/Hr) IV Continuous <Continuous>  dextrose 5%. 1000 milliLiter(s) (100 mL/Hr) IV Continuous <Continuous>  dextrose 50% Injectable 25 Gram(s) IV Push once  dextrose 50% Injectable 12.5 Gram(s) IV Push once  dextrose 50% Injectable 25 Gram(s) IV Push once  ferrous    sulfate 325 milliGRAM(s) Oral daily  glucagon  Injectable 1 milliGRAM(s) IntraMuscular once  insulin lispro (ADMELOG) corrective regimen sliding scale   SubCutaneous Before meals and at bedtime  levothyroxine 75 MICROGram(s) Oral daily  lisinopril 20 milliGRAM(s) Oral daily  sodium chloride 0.9%. 1000 milliLiter(s) (50 mL/Hr) IV Continuous <Continuous>    MEDICATIONS  (PRN):  dextrose Oral Gel 15 Gram(s) Oral once PRN Blood Glucose LESS THAN 70 milliGRAM(s)/deciliter      Allergies    No Known Allergies    Intolerances        SOCIAL HISTORY:  Smoker:  YES / NO        PACK YEARS:                         WHEN QUIT?  ETOH use:  YES / NO               FREQUENCY / QUANTITY:  Ilicit Drug use:  YES / NO  Occupation:  Assisted device use (Cane / Walker):  Live with:    FAMILY HISTORY:  Family history of cardiovascular disease (Mother)  Family history of hypertension        Review of Systems (Need 10):  CONSTITUTIONAL: Denies fevers / chills, sweats, fatigue, weight loss, weight gain                                       NEURO:  Denies parathesias, seizures, syncope, confusion                                                                                  EYES:  Denies blurry vision, discharge, pain, loss of vision                                                                                    ENMT:  Denies difficulty hearing, vertigo, dysphagia, epistaxis, recent dental work                                       CV:  Denies chest pain, palpitations, BRASWELL, orthopnea                                                                                           RESPIRATORY:  Denies wWheezing, SOB, cough / sputum, hemoptysis                                                               GI:  Denies nausea, vomiting, diarrhea, constipation, melena                                                                          : Denies hematuria, dysuria, urgency, incontinence                                                                                          MUSKULOSKELETAL:  Denies arthritis, joint swelling, muscle weakness                                                             SKIN/BREAST:  Denies rash, itching, hair loss, masses                                                                                              PSYCH:  Denies depression, anxiety, suicidal ideation                                                                                                HEME/LYMPH:  Denies bruises easily, enlarged lymph nodes, tender lymph nodes                                          ENDOCRINE:  Denies cold intolerance, heat intolerance, polydipsia                                                                      Vital Signs Last 24 Hrs  T(C): 36.9 (2022 13:32), Max: 36.9 (2022 13:32)  T(F): 98.4 (2022 13:32), Max: 98.4 (2022 13:32)  HR: 52 (2022 16:17) (44 - 65)  BP: 115/63 (2022 16:17) (101/50 - 147/89)  BP(mean): 52 (2022 16:17) (52 - 92)  RR: 16 (2022 16:17) (15 - 18)  SpO2: 100% (2022 16:17) (97% - 100%)    Physical Exam  General: NAD, sitting comfortably in chair, conversing appropriately  Head: NC/AT  Neurological: alert and oriented, UE and LE strength equal b/l, facial symmetry present  Cardiovascular: RRR, Clear S1 and S2, no murmurs appreciated  Respiratory: chest expansion symmetrical, CTA b/l, no wheezing noted  Gastrointestinal: +BS, soft, NT, ND  Extremities: moving spontaneously, no calf tenderness or edema.  Vascular: warm, well perfused. DP/PT pulses palpable b/l.  Incisions:                                                            LABS:                        9.4    7.20  )-----------( 258      ( 2022 06:10 )             29.7     06-04    138  |  105  |  12  ----------------------------<  104<H>  4.4   |  21<L>  |  0.77    Ca    9.1      2022 06:11  Phos  3.9     06-03  Mg     2.1     06-04    TPro  7.7  /  Alb  3.5  /  TBili  0.4  /  DBili  x   /  AST  37  /  ALT  52<H>  /  AlkPhos  74  06-04    PT/INR - ( 2022 06:10 )   PT: 15.6 sec;   INR: 1.31          PTT - ( 2022 06:10 )  PTT:33.8 sec    CARDIAC MARKERS ( 2022 06:52 )  x     / 0.72 ng/mL / 125 U/L / x     / 2.3 ng/mL  CARDIAC MARKERS ( 2022 00:50 )  x     / 0.99 ng/mL / 140 U/L / x     / 2.4 ng/mL  CARDIAC MARKERS ( 2022 21:31 )  x     / 0.94 ng/mL / x     / x     / x              RADIOLOGY & ADDITIONAL STUDIES:  CAROTID U/S:  not indicated at this time    CXR:  not indicated at this time    CT Scan:  < from: CT Angio Cardiac w/ IV Cont (22 @ 16:25) >  Cardiac:  1.  The calcium score is mild at 75 Agatston units.  2.  At the right coronary ostium the artery and its most proximal segment   are occluded secondary to calcified and noncalcified plaque.  3.  Findings were discussed with MARGO Byers by Dr. Gallardo on 6/3/2022 at   1710 hours.    Non-cardiac:  1.  The visualized lungs are clear. Included airways are normal. The   visualized segments the mediastinum are normal.    < end of copied text >    < from: CT Angio Chest PE Protocol w/ IV Cont (22 @ 16:22) >    IMPRESSION:    Mild biapical pneumonitis.    Negative for pulmonary embolism.    < end of copied text >      EKG:  < from: 12 Lead ECG (22 @ 10:55) >  Ventricular Rate 62 BPM    Atrial Rate 62 BPM    P-R Interval 128 ms    QRS Duration 116 ms    Q-T Interval 464 ms    QTC Calculation(Bazett) 470 ms    P Axis 52 degrees    R Axis 9 degrees    T Axis 178 degrees    Diagnosis Line Normal sinus rhythm  Left ventricular hypertrophy with QRS widening  Inferior infarct , age undetermined  ST & T wave abnormality, consider anterolateral ischemia    < end of copied text >      TTE / ROSA:  < from: TTE Echo Complete w/ Contrast w/ Doppler (22 @ 13:53) >  CONCLUSIONS:     1. Mildly dilated left ventricular size.   2. Mildly reduced left ventricular systolic function.   3. Dilated right ventricular size.   4. Reduced right ventricular systolic function.   5. Biatrial enlargement.   6. Mild-to-moderate aortic regurgitation.   7. Mild aortic stenosis.   8. Moderate mitral regurgitation.   9. There is severe, unrestricted tricuspid regurgitation.  10. Trivial pericardial effusion.  11. Previous studies were unavailable for review.    < end of copied text >      Cardiac Cath:  S/p diagnostic cardiac cath 22 w/ Dr Leigh: RCA flush occlusion at ostium w/ L-R collaterals. LM no sig disease, LAD/LCx w/ luminal irregularities. LVEDP 6. R TR access.   Surgeon: Dr. Sousa    Requesting Physician: Dr. 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 She noticed that her shortness of breath was worse than baseline on  and noticed she had to stop to catch her breath from walking from her room to the bathroom. Reports compliance with medications and diuretic regimen and took an extra dose of torsemide day before presentation with no improvement of SOB. Denies any lightheadedness, headache, CP, abdominal pain, nausea, vomiting, fever, chills. In ED she was noted to have elevated troponin and DDimer and was admitted to cardiology for possible CHF exacerbation in setting of NSTEMI. On 22 she underwent a diagnostic cardiac catheterization with Dr. CHAN        PAST MEDICAL & SURGICAL HISTORY:  Dyspnea and respiratory abnormality  Dyspnea on exertion      Hypothyroidism  Hypothyroid      Essential hypertension  Hypertension      Hodgkin&#x27;s disease  Hodgkin lymphoma      Other postprocedural status  S/P lymph node biopsy      Other postprocedural status  S/P  section          MEDICATIONS  (STANDING):  amLODIPine   Tablet 5 milliGRAM(s) Oral daily  aspirin  chewable 81 milliGRAM(s) Oral daily  atorvastatin 40 milliGRAM(s) Oral at bedtime  dextrose 5%. 1000 milliLiter(s) (50 mL/Hr) IV Continuous <Continuous>  dextrose 5%. 1000 milliLiter(s) (100 mL/Hr) IV Continuous <Continuous>  dextrose 50% Injectable 25 Gram(s) IV Push once  dextrose 50% Injectable 12.5 Gram(s) IV Push once  dextrose 50% Injectable 25 Gram(s) IV Push once  ferrous    sulfate 325 milliGRAM(s) Oral daily  glucagon  Injectable 1 milliGRAM(s) IntraMuscular once  insulin lispro (ADMELOG) corrective regimen sliding scale   SubCutaneous Before meals and at bedtime  levothyroxine 75 MICROGram(s) Oral daily  lisinopril 20 milliGRAM(s) Oral daily  sodium chloride 0.9%. 1000 milliLiter(s) (50 mL/Hr) IV Continuous <Continuous>    MEDICATIONS  (PRN):  dextrose Oral Gel 15 Gram(s) Oral once PRN Blood Glucose LESS THAN 70 milliGRAM(s)/deciliter      Allergies    No Known Allergies    Intolerances        SOCIAL HISTORY:  Smoker:  Denies  ETOH use:  Social, maybe two drinks a week  Ilicit Drug use:  denies  Assisted device use (Cane / Walker): none      FAMILY HISTORY:  Family history of cardiovascular disease (Mother)  Family history of hypertension        Review of Systems :  CONSTITUTIONAL: Denies fevers / chills, sweats, fatigue, weight loss, weight gain                                       NEURO:  Denies parathesias, seizures, syncope, confusion                                                                                  EYES:  Denies blurry vision, discharge, pain, loss of vision                                                                                    ENMT:  Denies difficulty hearing, vertigo, dysphagia, epistaxis, recent dental work                                       CV:  Denies chest pain, orthopnea                                                                                           RESPIRATORY:  Denies wheezing, cough / sputum, hemoptysis                                                               GI:  Denies nausea, vomiting, diarrhea, constipation, melena                                                                          : Denies hematuria, dysuria, urgency, incontinence                                                                                          MUSKULOSKELETAL:  Denies arthritis, joint swelling, muscle weakness                                                             SKIN/BREAST:  Denies rash, itching, hair loss, masses                                                                                              PSYCH:  Denies depression, anxiety, suicidal ideation                                                                                                HEME/LYMPH:  Denies bruises easily, enlarged lymph nodes, tender lymph nodes                                          ENDOCRINE:  Denies cold intolerance, heat intolerance, polydipsia                                                                      Vital Signs Last 24 Hrs  T(C): 36.9 (2022 13:32), Max: 36.9 (2022 13:32)  T(F): 98.4 (2022 13:32), Max: 98.4 (2022 13:32)  HR: 52 (2022 16:17) (44 - 65)  BP: 115/63 (2022 16:17) (101/50 - 147/89)  BP(mean): 52 (2022 16:17) (52 - 92)  RR: 16 (2022 16:17) (15 - 18)  SpO2: 100% (2022 16:17) (97% - 100%)    Physical Exam  General: NAD, sitting comfortably in bed, conversing appropriately  Head: NC/AT  Neurological: alert and oriented, UE and LE strength equal b/l, facial symmetry present  Cardiovascular: RRR, Clear S1 and S2, systolic murmur present  Respiratory: chest expansion symmetrical, CTA b/l, no wheezing noted  Gastrointestinal: +BS, soft, NT, ND  Extremities: moving spontaneously, no calf tenderness, trace b/l pedal edema.  Vascular: warm, well perfused. DP/PT pulses palpable b/l.  Skin: no obvious rashes noted  Incisions: R radial incision C/D/I, no drainage or purulence                                                            LABS:                        9.4    7.20  )-----------( 258      ( 2022 06:10 )             29.7     06-04    138  |  105  |  12  ----------------------------<  104<H>  4.4   |  21<L>  |  0.77    Ca    9.1      2022 06:11  Phos  3.9     06-03  Mg     2.1     06-04    TPro  7.7  /  Alb  3.5  /  TBili  0.4  /  DBili  x   /  AST  37  /  ALT  52<H>  /  AlkPhos  74  06-04    PT/INR - ( 2022 06:10 )   PT: 15.6 sec;   INR: 1.31          PTT - ( 2022 06:10 )  PTT:33.8 sec    CARDIAC MARKERS ( 2022 06:52 )  x     / 0.72 ng/mL / 125 U/L / x     / 2.3 ng/mL  CARDIAC MARKERS ( 2022 00:50 )  x     / 0.99 ng/mL / 140 U/L / x     / 2.4 ng/mL  CARDIAC MARKERS ( 2022 21:31 )  x     / 0.94 ng/mL / x     / x     / x              RADIOLOGY & ADDITIONAL STUDIES:  CAROTID U/S:  not indicated at this time    CXR:  not indicated at this time    CT Scan:  < from: CT Angio Cardiac w/ IV Cont (22 @ 16:25) >  Cardiac:  1.  The calcium score is mild at 75 Agatston units.  2.  At the right coronary ostium the artery and its most proximal segment   are occluded secondary to calcified and noncalcified plaque.  3.  Findings were discussed with MARGO Byers by Dr. Gallardo on 6/3/2022 at   1710 hours.    Non-cardiac:  1.  The visualized lungs are clear. Included airways are normal. The   visualized segments the mediastinum are normal.    < end of copied text >    < from: CT Angio Chest PE Protocol w/ IV Cont (22 @ 16:22) >    IMPRESSION:    Mild biapical pneumonitis.    Negative for pulmonary embolism.    < end of copied text >      EKG:  < from: 12 Lead ECG (22 @ 10:55) >  Ventricular Rate 62 BPM    Atrial Rate 62 BPM    P-R Interval 128 ms    QRS Duration 116 ms    Q-T Interval 464 ms    QTC Calculation(Bazett) 470 ms    P Axis 52 degrees    R Axis 9 degrees    T Axis 178 degrees    Diagnosis Line Normal sinus rhythm  Left ventricular hypertrophy with QRS widening  Inferior infarct , age undetermined  ST & T wave abnormality, consider anterolateral ischemia    < end of copied text >      TTE / ROSA:  < from: TTE Echo Complete w/ Contrast w/ Doppler (22 @ 13:53) >  CONCLUSIONS:     1. Mildly dilated left ventricular size.   2. Mildly reduced left ventricular systolic function.   3. Dilated right ventricular size.   4. Reduced right ventricular systolic function.   5. Biatrial enlargement.   6. Mild-to-moderate aortic regurgitation.   7. Mild aortic stenosis.   8. Moderate mitral regurgitation.   9. There is severe, unrestricted tricuspid regurgitation.  10. Trivial pericardial effusion.  11. Previous studies were unavailable for review.    < end of copied text >      Cardiac Cath:  S/p diagnostic cardiac cath 22 w/ Dr Leigh: RCA flush occlusion at ostium w/ L-R collaterals. LM no sig disease, LAD/LCx w/ luminal irregularities. LVEDP 6. R TR access.   Surgeon: Dr. Sousa    Requesting Physician: Dr. 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 She noticed that her shortness of breath was worse than baseline on  and noticed she had to stop to catch her breath from walking from her room to the bathroom. Reports compliance with medications and diuretic regimen and took an extra dose of torsemide day before presentation with no improvement of SOB. Denies any lightheadedness, headache, CP, abdominal pain, nausea, vomiting, fever, chills. In ED she was noted to have elevated troponin and DDimer and was admitted to cardiology for possible CHF exacerbation in setting of NSTEMI. CCTA and CT chest PE protocol revealed no PE, but right coronary ostium the artery and its most proximal segment are occluded secondary to calcified and noncalcified plaque. On 22 she underwent a diagnostic cardiac catheterization with Dr. Leigh        PAST MEDICAL & SURGICAL HISTORY:  Dyspnea and respiratory abnormality  Dyspnea on exertion      Hypothyroidism  Hypothyroid      Essential hypertension  Hypertension      Hodgkin&#x27;s disease  Hodgkin lymphoma      Other postprocedural status  S/P lymph node biopsy      Other postprocedural status  S/P  section          MEDICATIONS  (STANDING):  amLODIPine   Tablet 5 milliGRAM(s) Oral daily  aspirin  chewable 81 milliGRAM(s) Oral daily  atorvastatin 40 milliGRAM(s) Oral at bedtime  dextrose 5%. 1000 milliLiter(s) (50 mL/Hr) IV Continuous <Continuous>  dextrose 5%. 1000 milliLiter(s) (100 mL/Hr) IV Continuous <Continuous>  dextrose 50% Injectable 25 Gram(s) IV Push once  dextrose 50% Injectable 12.5 Gram(s) IV Push once  dextrose 50% Injectable 25 Gram(s) IV Push once  ferrous    sulfate 325 milliGRAM(s) Oral daily  glucagon  Injectable 1 milliGRAM(s) IntraMuscular once  insulin lispro (ADMELOG) corrective regimen sliding scale   SubCutaneous Before meals and at bedtime  levothyroxine 75 MICROGram(s) Oral daily  lisinopril 20 milliGRAM(s) Oral daily  sodium chloride 0.9%. 1000 milliLiter(s) (50 mL/Hr) IV Continuous <Continuous>    MEDICATIONS  (PRN):  dextrose Oral Gel 15 Gram(s) Oral once PRN Blood Glucose LESS THAN 70 milliGRAM(s)/deciliter      Allergies    No Known Allergies    Intolerances        SOCIAL HISTORY:  Smoker:  Denies  ETOH use:  Social, maybe two drinks a week  Ilicit Drug use:  denies  Assisted device use (Cane / Walker): none      FAMILY HISTORY:  Family history of cardiovascular disease (Mother)  Family history of hypertension        Review of Systems :  CONSTITUTIONAL: Denies fevers / chills, sweats, fatigue, weight loss, weight gain                                       NEURO:  Denies parathesias, seizures, syncope, confusion                                                                                  EYES:  Denies blurry vision, discharge, pain, loss of vision                                                                                    ENMT:  Denies difficulty hearing, vertigo, dysphagia, epistaxis, recent dental work                                       CV:  Denies chest pain, orthopnea                                                                                           RESPIRATORY:  Denies wheezing, cough / sputum, hemoptysis                                                               GI:  Denies nausea, vomiting, diarrhea, constipation, melena                                                                          : Denies hematuria, dysuria, urgency, incontinence                                                                                          MUSKULOSKELETAL:  Denies arthritis, joint swelling, muscle weakness                                                             SKIN/BREAST:  Denies rash, itching, hair loss, masses                                                                                              PSYCH:  Denies depression, anxiety, suicidal ideation                                                                                                HEME/LYMPH:  Denies bruises easily, enlarged lymph nodes, tender lymph nodes                                          ENDOCRINE:  Denies cold intolerance, heat intolerance, polydipsia                                                                      Vital Signs Last 24 Hrs  T(C): 36.9 (2022 13:32), Max: 36.9 (2022 13:32)  T(F): 98.4 (2022 13:32), Max: 98.4 (2022 13:32)  HR: 52 (2022 16:17) (44 - 65)  BP: 115/63 (2022 16:17) (101/50 - 147/89)  BP(mean): 52 (2022 16:17) (52 - 92)  RR: 16 (2022 16:17) (15 - 18)  SpO2: 100% (2022 16:17) (97% - 100%)    Physical Exam  General: NAD, sitting comfortably in bed, conversing appropriately  Head: NC/AT  Neurological: alert and oriented, UE and LE strength equal b/l, facial symmetry present  Cardiovascular: RRR, Clear S1 and S2, systolic murmur present  Respiratory: chest expansion symmetrical, CTA b/l, no wheezing noted  Gastrointestinal: +BS, soft, NT, ND  Extremities: moving spontaneously, no calf tenderness, trace b/l pedal edema.  Vascular: warm, well perfused. DP/PT pulses palpable b/l.  Skin: no obvious rashes noted  Incisions: R radial incision C/D/I, no drainage or purulence                                                            LABS:                        9.4    7.20  )-----------( 258      ( 2022 06:10 )             29.7     06-04    138  |  105  |  12  ----------------------------<  104<H>  4.4   |  21<L>  |  0.77    Ca    9.1      2022 06:11  Phos  3.9     06-03  Mg     2.1     06-04    TPro  7.7  /  Alb  3.5  /  TBili  0.4  /  DBili  x   /  AST  37  /  ALT  52<H>  /  AlkPhos  74  06-04    PT/INR - ( 2022 06:10 )   PT: 15.6 sec;   INR: 1.31          PTT - ( 2022 06:10 )  PTT:33.8 sec    CARDIAC MARKERS ( 2022 06:52 )  x     / 0.72 ng/mL / 125 U/L / x     / 2.3 ng/mL  CARDIAC MARKERS ( 2022 00:50 )  x     / 0.99 ng/mL / 140 U/L / x     / 2.4 ng/mL  CARDIAC MARKERS ( 2022 21:31 )  x     / 0.94 ng/mL / x     / x     / x              RADIOLOGY & ADDITIONAL STUDIES:  CAROTID U/S:  not indicated at this time    CXR:  not indicated at this time    CT Scan:  < from: CT Angio Cardiac w/ IV Cont (22 @ 16:25) >  Cardiac:  1.  The calcium score is mild at 75 Agatston units.  2.  At the right coronary ostium the artery and its most proximal segment   are occluded secondary to calcified and noncalcified plaque.  3.  Findings were discussed with MARGO Byers by Dr. Gallardo on 6/3/2022 at   1710 hours.    Non-cardiac:  1.  The visualized lungs are clear. Included airways are normal. The   visualized segments the mediastinum are normal.    < end of copied text >    < from: CT Angio Chest PE Protocol w/ IV Cont (22 @ 16:22) >    IMPRESSION:    Mild biapical pneumonitis.    Negative for pulmonary embolism.    < end of copied text >      EKG:  < from: 12 Lead ECG (22 @ 10:55) >  Ventricular Rate 62 BPM    Atrial Rate 62 BPM    P-R Interval 128 ms    QRS Duration 116 ms    Q-T Interval 464 ms    QTC Calculation(Bazett) 470 ms    P Axis 52 degrees    R Axis 9 degrees    T Axis 178 degrees    Diagnosis Line Normal sinus rhythm  Left ventricular hypertrophy with QRS widening  Inferior infarct , age undetermined  ST & T wave abnormality, consider anterolateral ischemia    < end of copied text >      TTE / ROSA:  < from: TTE Echo Complete w/ Contrast w/ Doppler (22 @ 13:53) >  CONCLUSIONS:     1. Mildly dilated left ventricular size.   2. Mildly reduced left ventricular systolic function.   3. Dilated right ventricular size.   4. Reduced right ventricular systolic function.   5. Biatrial enlargement.   6. Mild-to-moderate aortic regurgitation.   7. Mild aortic stenosis.   8. Moderate mitral regurgitation.   9. There is severe, unrestricted tricuspid regurgitation.  10. Trivial pericardial effusion.  11. Previous studies were unavailable for review.    < end of copied text >      Cardiac Cath:  S/p diagnostic cardiac cath 22 w/ Dr Leigh: RCA flush occlusion at ostium w/ L-R collaterals. LM no sig disease, LAD/LCx w/ luminal irregularities. LVEDP 6. R TR access.   Surgeon: Dr. Sousa    Requesting Physician: Dr. 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 She noticed that her shortness of breath was worse than baseline on  and noticed she had to stop to catch her breath from walking from her room to the bathroom. Reports compliance with medications and diuretic regimen and took an extra dose of torsemide day before presentation with no improvement of SOB. Denies any lightheadedness, headache, CP, abdominal pain, nausea, vomiting, fever, chills. In ED she was noted to have elevated troponin and DDimer and was admitted to cardiology for possible CHF exacerbation in setting of NSTEMI. CCTA and CT chest PE protocol revealed no PE, but right coronary ostium the artery and its most proximal segment are occluded secondary to calcified and noncalcified plaque. TTE 6/3/22 revealed Mild-to-moderate aortic regurgitation, Mild aortic stenosis, Moderate mitral regurgitation, and severe, unrestricted tricuspid regurgitation, EF 45%. On 22 she underwent a diagnostic cardiac catheterization with Dr. Leigh showing RCA flush occlusion at ostium w/ L-R collaterals and no intervention. Structural heart team was consulted for evaluation of severe TR and moderate MR.      PAST MEDICAL & SURGICAL HISTORY:  Dyspnea and respiratory abnormality  Dyspnea on exertion      Hypothyroidism  Hypothyroid      Essential hypertension  Hypertension      Hodgkin&#x27;s disease  Hodgkin lymphoma      Other postprocedural status  S/P lymph node biopsy      Other postprocedural status  S/P  section          MEDICATIONS  (STANDING):  amLODIPine   Tablet 5 milliGRAM(s) Oral daily  aspirin  chewable 81 milliGRAM(s) Oral daily  atorvastatin 40 milliGRAM(s) Oral at bedtime  dextrose 5%. 1000 milliLiter(s) (50 mL/Hr) IV Continuous <Continuous>  dextrose 5%. 1000 milliLiter(s) (100 mL/Hr) IV Continuous <Continuous>  dextrose 50% Injectable 25 Gram(s) IV Push once  dextrose 50% Injectable 12.5 Gram(s) IV Push once  dextrose 50% Injectable 25 Gram(s) IV Push once  ferrous    sulfate 325 milliGRAM(s) Oral daily  glucagon  Injectable 1 milliGRAM(s) IntraMuscular once  insulin lispro (ADMELOG) corrective regimen sliding scale   SubCutaneous Before meals and at bedtime  levothyroxine 75 MICROGram(s) Oral daily  lisinopril 20 milliGRAM(s) Oral daily  sodium chloride 0.9%. 1000 milliLiter(s) (50 mL/Hr) IV Continuous <Continuous>    MEDICATIONS  (PRN):  dextrose Oral Gel 15 Gram(s) Oral once PRN Blood Glucose LESS THAN 70 milliGRAM(s)/deciliter      Allergies    No Known Allergies    Intolerances        SOCIAL HISTORY:  Smoker:  Denies  ETOH use:  Social, maybe two drinks a week  Ilic[employer] Drug use:  denies  Assisted device use (Cane / Walker): none      FAMILY HISTORY:  Family history of cardiovascular disease (Mother)  Family history of hypertension        Review of Systems :  CONSTITUTIONAL: Denies fevers / chills, sweats, fatigue, weight loss, weight gain                                       NEURO:  Denies parathesias, seizures, syncope, confusion                                                                                  EYES:  Denies blurry vision, discharge, pain, loss of vision                                                                                    ENMT:  Denies difficulty hearing, vertigo, dysphagia, epistaxis, recent dental work                                       CV:  Denies chest pain, orthopnea                                                                                           RESPIRATORY:  Denies wheezing, cough / sputum, hemoptysis                                                               GI:  Denies nausea, vomiting, diarrhea, constipation, melena                                                                          : Denies hematuria, dysuria, urgency, incontinence                                                                                          MUSKULOSKELETAL:  Denies arthritis, joint swelling, muscle weakness                                                             SKIN/BREAST:  Denies rash, itching, hair loss, masses                                                                                              PSYCH:  Denies depression, anxiety, suicidal ideation                                                                                                HEME/LYMPH:  Denies bruises easily, enlarged lymph nodes, tender lymph nodes                                          ENDOCRINE:  Denies cold intolerance, heat intolerance, polydipsia                                                                      Vital Signs Last 24 Hrs  T(C): 36.9 (2022 13:32), Max: 36.9 (2022 13:32)  T(F): 98.4 (2022 13:32), Max: 98.4 (2022 13:32)  HR: 52 (2022 16:17) (44 - 65)  BP: 115/63 (2022 16:17) (101/50 - 147/89)  BP(mean): 52 (2022 16:17) (52 - 92)  RR: 16 (2022 16:17) (15 - 18)  SpO2: 100% (2022 16:17) (97% - 100%)    Physical Exam  General: NAD, sitting comfortably in bed, conversing appropriately  Head: NC/AT  Neurological: alert and oriented, UE and LE strength equal b/l, facial symmetry present  Cardiovascular: RRR, Clear S1 and S2, systolic murmur present  Respiratory: chest expansion symmetrical, CTA b/l, no wheezing noted  Gastrointestinal: +BS, soft, NT, ND  Extremities: moving spontaneously, no calf tenderness, trace b/l pedal edema.  Vascular: warm, well perfused. DP/PT pulses palpable b/l.  Skin: no obvious rashes noted  Incisions: R radial incision C/D/I, no drainage or purulence                                                            LABS:                        9.4    7.20  )-----------( 258      ( 2022 06:10 )             29.7     06-04    138  |  105  |  12  ----------------------------<  104<H>  4.4   |  21<L>  |  0.77    Ca    9.1      2022 06:11  Phos  3.9     06-03  Mg     2.1     06-04    TPro  7.7  /  Alb  3.5  /  TBili  0.4  /  DBili  x   /  AST  37  /  ALT  52<H>  /  AlkPhos  74  06-04    PT/INR - ( 2022 06:10 )   PT: 15.6 sec;   INR: 1.31          PTT - ( 2022 06:10 )  PTT:33.8 sec    CARDIAC MARKERS ( 2022 06:52 )  x     / 0.72 ng/mL / 125 U/L / x     / 2.3 ng/mL  CARDIAC MARKERS ( 2022 00:50 )  x     / 0.99 ng/mL / 140 U/L / x     / 2.4 ng/mL  CARDIAC MARKERS ( 2022 21:31 )  x     / 0.94 ng/mL / x     / x     / x              RADIOLOGY & ADDITIONAL STUDIES:  CAROTID U/S:  not indicated at this time    CXR:  not indicated at this time    CT Scan:  < from: CT Angio Cardiac w/ IV Cont (22 @ 16:25) >  Cardiac:  1.  The calcium score is mild at 75 Agatston units.  2.  At the right coronary ostium the artery and its most proximal segment   are occluded secondary to calcified and noncalcified plaque.  3.  Findings were discussed with MARGO Byers by Dr. Gallardo on 6/3/2022 at   1710 hours.    Non-cardiac:  1.  The visualized lungs are clear. Included airways are normal. The   visualized segments the mediastinum are normal.    < end of copied text >    < from: CT Angio Chest PE Protocol w/ IV Cont (22 @ 16:22) >    IMPRESSION:    Mild biapical pneumonitis.    Negative for pulmonary embolism.    < end of copied text >      EKG:  < from: 12 Lead ECG (22 @ 10:55) >  Ventricular Rate 62 BPM    Atrial Rate 62 BPM    P-R Interval 128 ms    QRS Duration 116 ms    Q-T Interval 464 ms    QTC Calculation(Bazett) 470 ms    P Axis 52 degrees    R Axis 9 degrees    T Axis 178 degrees    Diagnosis Line Normal sinus rhythm  Left ventricular hypertrophy with QRS widening  Inferior infarct , age undetermined  ST & T wave abnormality, consider anterolateral ischemia    < end of copied text >      TTE / ROSA:  < from: TTE Echo Complete w/ Contrast w/ Doppler (22 @ 13:53) >  CONCLUSIONS:     1. Mildly dilated left ventricular size.   2. Mildly reduced left ventricular systolic function.   3. Dilated right ventricular size.   4. Reduced right ventricular systolic function.   5. Biatrial enlargement.   6. Mild-to-moderate aortic regurgitation.   7. Mild aortic stenosis.   8. Moderate mitral regurgitation.   9. There is severe, unrestricted tricuspid regurgitation.  10. Trivial pericardial effusion.  11. Previous studies were unavailable for review.    < end of copied text >      Cardiac Cath:  S/p diagnostic cardiac cath 22 w/ Dr Leigh: RCA flush occlusion at ostium w/ L-R collaterals. LM no sig disease, LAD/LCx w/ luminal irregularities. LVEDP 6. R TR access.

## 2022-06-04 NOTE — PROGRESS NOTE ADULT - SUBJECTIVE AND OBJECTIVE BOX
CARDIOLOGY NP PROGRESS NOTE    Subjective: Pt seen and examined at bedside. Reports feeling still SOB w/ minimal exertion. Denies chest pain, lightheadedness, dizziness, fever, chills.  Remainder ROS otherwise negative.    Overnight Events: NPO s/p MN for cardiac cath. S/p diagnostic cardiac cath revealed oRCA w/ flush occlusion w/ L-R collaterals.     TELEMETRY: SB 50s, frequent PACs         VITAL SIGNS:  T(C): 36.9 (22 @ 13:32), Max: 36.9 (22 @ 13:32)  HR: 50 (22 @ 12:31) (44 - 65)  BP: 129/67 (22 @ 12:31) (101/50 - 147/89)  RR: 16 (22 @ 12:31) (15 - 18)  SpO2: 99% (22 @ 12:31) (97% - 100%)  Wt(kg): --    I&O's Summary    2022 07:  -  2022 07:00  --------------------------------------------------------  IN: 780 mL / OUT: 1600 mL / NET: -820 mL    2022 07:01  -  2022 14:50  --------------------------------------------------------  IN: 540 mL / OUT: 250 mL / NET: 290 mL          PHYSICAL EXAM:    General: A/ox 3, No acute Distress, obese  Neck: Supple, NO JVD  Cardiac: S1 S2, grade II/VI systolic murmur at LSB  Pulmonary: CTA michael, Breathing unlabored on RA, No Rhonchi/Rales/Wheezing  Abdomen: Soft, Non -tender, +BS x 4 quads  Extremities: No Rashes, No edema  Vascular: 2+ radial pulses michael, no femoral bruits michael, 2+ DP/PT pulses michael  Neuro: A/o x 3, No focal deficits          LABS:                          9.4    7.20  )-----------( 258      ( 2022 06:10 )             29.7                              06-04    138  |  105  |  12  ----------------------------<  104<H>  4.4   |  21<L>  |  0.77    Ca    9.1      2022 06:11  Phos  3.9     06-03  Mg     2.1     06-04    TPro  7.7  /  Alb  3.5  /  TBili  0.4  /  DBili  x   /  AST  37  /  ALT  52<H>  /  AlkPhos  74  06-04    LIVER FUNCTIONS - ( 2022 06:11 )  Alb: 3.5 g/dL / Pro: 7.7 g/dL / ALK PHOS: 74 U/L / ALT: 52 U/L / AST: 37 U/L / GGT: x         PT/INR - ( 2022 06:10 )   PT: 15.6 sec;   INR: 1.31          PTT - ( 2022 06:10 )  PTT:33.8 sec  CAPILLARY BLOOD GLUCOSE      POCT Blood Glucose.: 98 mg/dL (2022 11:44)  POCT Blood Glucose.: 107 mg/dL (2022 06:13)  POCT Blood Glucose.: 130 mg/dL (2022 21:28)  POCT Blood Glucose.: 106 mg/dL (2022 16:51)    CARDIAC MARKERS ( 2022 06:52 )  x     / 0.72 ng/mL / 125 U/L / x     / 2.3 ng/mL  CARDIAC MARKERS ( 2022 00:50 )  x     / 0.99 ng/mL / 140 U/L / x     / 2.4 ng/mL  CARDIAC MARKERS ( 2022 21:31 )  x     / 0.94 ng/mL / x     / x     / x              Allergies:  No Known Allergies    MEDICATIONS  (STANDING):  acetaminophen     Tablet .. 650 milliGRAM(s) Oral once  amLODIPine   Tablet 5 milliGRAM(s) Oral daily  aspirin  chewable 81 milliGRAM(s) Oral daily  atorvastatin 40 milliGRAM(s) Oral at bedtime  dextrose 5%. 1000 milliLiter(s) (50 mL/Hr) IV Continuous <Continuous>  dextrose 5%. 1000 milliLiter(s) (100 mL/Hr) IV Continuous <Continuous>  dextrose 50% Injectable 25 Gram(s) IV Push once  dextrose 50% Injectable 12.5 Gram(s) IV Push once  dextrose 50% Injectable 25 Gram(s) IV Push once  glucagon  Injectable 1 milliGRAM(s) IntraMuscular once  insulin lispro (ADMELOG) corrective regimen sliding scale   SubCutaneous Before meals and at bedtime  levothyroxine 75 MICROGram(s) Oral daily  lisinopril 20 milliGRAM(s) Oral daily  sodium chloride 0.9%. 1000 milliLiter(s) (50 mL/Hr) IV Continuous <Continuous>    MEDICATIONS  (PRN):  dextrose Oral Gel 15 Gram(s) Oral once PRN Blood Glucose LESS THAN 70 milliGRAM(s)/deciliter        DIAGNOSTIC TESTS:     < from: TTE Echo Complete w/ Contrast w/ Doppler (22 @ 13:53) >  CONCLUSIONS:   1. Mildly dilated left ventricular size.   2. Mildly reduced left ventricular systolic function.   3. Dilated right ventricular size.   4. Reduced right ventricular systolic function.   5. Biatrial enlargement.   6. Mild-to-moderate aortic regurgitation.   7. Mild aortic stenosis.   8. Moderate mitral regurgitation.   9. There is severe, unrestricted tricuspid regurgitation.  10. Trivial pericardial effusion.  11. Previous studies were unavailable for review.    ---------------------------------------------------------------------------  -----  2D AND M-MODE MEASUREMENTS (normal ranges within parentheses):    Left Ventricle:         Normal - Men Normal - Women  IVSd (2D):      0.98 cm  (0.6-1.0)     (0.6-0.9)  LVPWd (2D):     0.88 cm  (0.6-1.0)     (0.6-0.9)  LVIDd (2D):     5.95 cm  (4.2-5.8)     (3.8-5.2)  LVIDs (2D):     4.61 cm  LV FS (2D):     11.5 %     (>25%)  Aorta/Left Atrium:         Normal - Men Normal - Women  Aortic Root (2D):  3.00 cm  (2.4-3.7)    LA Volume A/L:    Right Ventricle:    LV DIASTOLIC FUNCTION:    MV Peak E: 101.00 cm/s MV e' lat:  6.6 cm/s MV E/e' lat ratio: 15.2  MV Peak A: 119.00 cm/s MV e' med:  5.2 cm/s MV E/e' med ratio: 19.3  E/A Ratio: 0.85        Decel Time: 237 msec MV E/e' av.3    SPECTRAL DOPPLER ANALYSIS:    Mitral Valve:  MV Max Fabian:   MV P1/2 Time: 68.73 msec  MV Mean Grad: MV Area, PHT: 3.20 cm²      Aortic Valve: AoV Max Fabian:    2.61 m/s AoV Peak P mmHg  AoV Mean   PG:   15 mmHg  LVOT Vmax:      1.18 m/s LVOT VTI:      0.200 m  LVOT Diameter: 1.90 cm  AoV Area, VMax: 1.28 cm² AoV Area, VTI: 1.17 cm² AoV Area, Vmn: 1.15 cm²      Aortic Insufficiency:  AI Half-time:  590 msec  AI Decel Rate: 1.62 m/s²      Tricuspid Valve and PA/RV Systolic Pressure: TR Max Velocity: 2.13 m/s RA   Pressure: 3 mmHg RVSP/PASP: 21 mmHg  TAPSE:           12 mm    RV S':       8 cm/s      Pulmonic Valve:  PV Max Velocity: 0.66 m/s PV Max P mmHg PV Mean P mmHg      ---------------------------------------------------------------------------  -----  FINDINGS:    Left Ventricle:  The left ventricle cavity size is mildly dilated. There is mild   concentric left ventricular hypertrophy. Abnormal septal motion seen due   to right ventricular volume overload. Left ventricular ejection fraction   is 45-50%. Analysis of mitral annular tissue Doppler, left atrial volume   index, and tricuspid regurgitant velocity reveals: grade I diastolic   dysfunction without elevated filling pressure.    Right Ventricle:  The right ventricle is dilated. Right ventricular systolic function is   reduced. The tricuspid annular plane systolic excursion (TAPSE) is 12.10   mm (normal >=17 mm). RV tissue Doppler S' is 7.94 cm/s (normal >10 cm/s).    Left Atrium:  The left atrium is dilated.    Right Atrium:  The right atrium is dilated.    Aortic Valve:  The aortic leaflets are thickened and calcified with reduced systolic   leaflet excursion. There is mild aortic stenosis. The peak transvalvular   velocity is 2.61 m/s, the mean transvalvular gradient is 15.00 mmHg, and   the LVOT/AV velocity ratio is 0.41. There is mild-to-moderate aortic   regurgitation.    Mitral Valve:  The mitral valve is mildly thickened. Thereis moderate mitral   regurgitation.    Tricuspid Valve:  There is malcoaptation of the tricuspid valve. There is severe   unrestricted tricuspid regurgitation. Pulmonary artery systolic pressure   (estimated using the tricuspid regurgitant gradient and an estimate of   right atrial pressure) is 36 mmHg-this may be underestimated given   severity of TR.    Inferior Vena Cava:  The inferior vena cava is dilated (>2.1cm) with abnormal inspiratory   collapse (<50%) consistent with elevated right atrial pressure (  15, range 10-20mmHg).    Pulmonic Valve:  Structurally normal pulmonic valve with normal leaflet excursion. There   is mild pulmonic regurgitation.    Aorta:  The aortic root is normal in size.    Pericardium:  Trivial pericardial effusion.    < end of copied text >     CARDIOLOGY NP PROGRESS NOTE    Subjective: Pt seen and examined at bedside. Reports feeling still SOB w/ minimal exertion. Denies chest pain, lightheadedness, dizziness, fever, chills.  Remainder ROS otherwise negative.    Overnight Events: NPO s/p MN for cardiac cath. S/p diagnostic cardiac cath revealed ostial RCA w/ flush occlusion w/ L-R collaterals.     TELEMETRY: SB 50s, frequent PACs         VITAL SIGNS:  T(C): 36.9 (22 @ 13:32), Max: 36.9 (22 @ 13:32)  HR: 50 (22 @ 12:31) (44 - 65)  BP: 129/67 (22 @ 12:31) (101/50 - 147/89)  RR: 16 (22 @ 12:31) (15 - 18)  SpO2: 99% (22 @ 12:31) (97% - 100%)  Wt(kg): --    I&O's Summary    2022 07:  -  2022 07:00  --------------------------------------------------------  IN: 780 mL / OUT: 1600 mL / NET: -820 mL    2022 07:01  -  2022 14:50  --------------------------------------------------------  IN: 540 mL / OUT: 250 mL / NET: 290 mL          PHYSICAL EXAM:    General: A/ox 3, No acute Distress, obese  Neck: Supple, NO JVD  Cardiac: S1 S2, grade II/VI systolic murmur at LSB  Pulmonary: CTA michael, Breathing unlabored on RA, No Rhonchi/Rales/Wheezing  Abdomen: Soft, Non -tender, +BS x 4 quads  Extremities: No Rashes, No edema  Vascular: 2+ radial pulses michael, no femoral bruits michael, 2+ DP/PT pulses michael  Neuro: A/o x 3, No focal deficits          LABS:                          9.4    7.20  )-----------( 258      ( 2022 06:10 )             29.7                              06-04    138  |  105  |  12  ----------------------------<  104<H>  4.4   |  21<L>  |  0.77    Ca    9.1      2022 06:11  Phos  3.9     06-03  Mg     2.1     06-04    TPro  7.7  /  Alb  3.5  /  TBili  0.4  /  DBili  x   /  AST  37  /  ALT  52<H>  /  AlkPhos  74  06-04    LIVER FUNCTIONS - ( 2022 06:11 )  Alb: 3.5 g/dL / Pro: 7.7 g/dL / ALK PHOS: 74 U/L / ALT: 52 U/L / AST: 37 U/L / GGT: x         PT/INR - ( 2022 06:10 )   PT: 15.6 sec;   INR: 1.31          PTT - ( 2022 06:10 )  PTT:33.8 sec  CAPILLARY BLOOD GLUCOSE      POCT Blood Glucose.: 98 mg/dL (2022 11:44)  POCT Blood Glucose.: 107 mg/dL (2022 06:13)  POCT Blood Glucose.: 130 mg/dL (2022 21:28)  POCT Blood Glucose.: 106 mg/dL (2022 16:51)    CARDIAC MARKERS ( 2022 06:52 )  x     / 0.72 ng/mL / 125 U/L / x     / 2.3 ng/mL  CARDIAC MARKERS ( 2022 00:50 )  x     / 0.99 ng/mL / 140 U/L / x     / 2.4 ng/mL  CARDIAC MARKERS ( 2022 21:31 )  x     / 0.94 ng/mL / x     / x     / x              Allergies:  No Known Allergies    MEDICATIONS  (STANDING):  acetaminophen     Tablet .. 650 milliGRAM(s) Oral once  amLODIPine   Tablet 5 milliGRAM(s) Oral daily  aspirin  chewable 81 milliGRAM(s) Oral daily  atorvastatin 40 milliGRAM(s) Oral at bedtime  dextrose 5%. 1000 milliLiter(s) (50 mL/Hr) IV Continuous <Continuous>  dextrose 5%. 1000 milliLiter(s) (100 mL/Hr) IV Continuous <Continuous>  dextrose 50% Injectable 25 Gram(s) IV Push once  dextrose 50% Injectable 12.5 Gram(s) IV Push once  dextrose 50% Injectable 25 Gram(s) IV Push once  glucagon  Injectable 1 milliGRAM(s) IntraMuscular once  insulin lispro (ADMELOG) corrective regimen sliding scale   SubCutaneous Before meals and at bedtime  levothyroxine 75 MICROGram(s) Oral daily  lisinopril 20 milliGRAM(s) Oral daily  sodium chloride 0.9%. 1000 milliLiter(s) (50 mL/Hr) IV Continuous <Continuous>    MEDICATIONS  (PRN):  dextrose Oral Gel 15 Gram(s) Oral once PRN Blood Glucose LESS THAN 70 milliGRAM(s)/deciliter        DIAGNOSTIC TESTS:     < from: TTE Echo Complete w/ Contrast w/ Doppler (22 @ 13:53) >  CONCLUSIONS:   1. Mildly dilated left ventricular size.   2. Mildly reduced left ventricular systolic function.   3. Dilated right ventricular size.   4. Reduced right ventricular systolic function.   5. Biatrial enlargement.   6. Mild-to-moderate aortic regurgitation.   7. Mild aortic stenosis.   8. Moderate mitral regurgitation.   9. There is severe, unrestricted tricuspid regurgitation.  10. Trivial pericardial effusion.  11. Previous studies were unavailable for review.    ---------------------------------------------------------------------------  -----  2D AND M-MODE MEASUREMENTS (normal ranges within parentheses):    Left Ventricle:         Normal - Men Normal - Women  IVSd (2D):      0.98 cm  (0.6-1.0)     (0.6-0.9)  LVPWd (2D):     0.88 cm  (0.6-1.0)     (0.6-0.9)  LVIDd (2D):     5.95 cm  (4.2-5.8)     (3.8-5.2)  LVIDs (2D):     4.61 cm  LV FS (2D):     11.5 %     (>25%)  Aorta/Left Atrium:         Normal - Men Normal - Women  Aortic Root (2D):  3.00 cm  (2.4-3.7)    LA Volume A/L:    Right Ventricle:    LV DIASTOLIC FUNCTION:    MV Peak E: 101.00 cm/s MV e' lat:  6.6 cm/s MV E/e' lat ratio: 15.2  MV Peak A: 119.00 cm/s MV e' med:  5.2 cm/s MV E/e' med ratio: 19.3  E/A Ratio: 0.85        Decel Time: 237 msec MV E/e' av.3    SPECTRAL DOPPLER ANALYSIS:    Mitral Valve:  MV Max Fabian:   MV P1/2 Time: 68.73 msec  MV Mean Grad: MV Area, PHT: 3.20 cm²      Aortic Valve: AoV Max Fabian:    2.61 m/s AoV Peak P mmHg  AoV Mean   PG:   15 mmHg  LVOT Vmax:      1.18 m/s LVOT VTI:      0.200 m  LVOT Diameter: 1.90 cm  AoV Area, VMax: 1.28 cm² AoV Area, VTI: 1.17 cm² AoV Area, Vmn: 1.15 cm²      Aortic Insufficiency:  AI Half-time:  590 msec  AI Decel Rate: 1.62 m/s²      Tricuspid Valve and PA/RV Systolic Pressure: TR Max Velocity: 2.13 m/s RA   Pressure: 3 mmHg RVSP/PASP: 21 mmHg  TAPSE:           12 mm    RV S':       8 cm/s      Pulmonic Valve:  PV Max Velocity: 0.66 m/s PV Max P mmHg PV Mean P mmHg      ---------------------------------------------------------------------------  -----  FINDINGS:    Left Ventricle:  The left ventricle cavity size is mildly dilated. There is mild   concentric left ventricular hypertrophy. Abnormal septal motion seen due   to right ventricular volume overload. Left ventricular ejection fraction   is 45-50%. Analysis of mitral annular tissue Doppler, left atrial volume   index, and tricuspid regurgitant velocity reveals: grade I diastolic   dysfunction without elevated filling pressure.    Right Ventricle:  The right ventricle is dilated. Right ventricular systolic function is   reduced. The tricuspid annular plane systolic excursion (TAPSE) is 12.10   mm (normal >=17 mm). RV tissue Doppler S' is 7.94 cm/s (normal >10 cm/s).    Left Atrium:  The left atrium is dilated.    Right Atrium:  The right atrium is dilated.    Aortic Valve:  The aortic leaflets are thickened and calcified with reduced systolic   leaflet excursion. There is mild aortic stenosis. The peak transvalvular   velocity is 2.61 m/s, the mean transvalvular gradient is 15.00 mmHg, and   the LVOT/AV velocity ratio is 0.41. There is mild-to-moderate aortic   regurgitation.    Mitral Valve:  The mitral valve is mildly thickened. Thereis moderate mitral   regurgitation.    Tricuspid Valve:  There is malcoaptation of the tricuspid valve. There is severe   unrestricted tricuspid regurgitation. Pulmonary artery systolic pressure   (estimated using the tricuspid regurgitant gradient and an estimate of   right atrial pressure) is 36 mmHg-this may be underestimated given   severity of TR.    Inferior Vena Cava:  The inferior vena cava is dilated (>2.1cm) with abnormal inspiratory   collapse (<50%) consistent with elevated right atrial pressure (  15, range 10-20mmHg).    Pulmonic Valve:  Structurally normal pulmonic valve with normal leaflet excursion. There   is mild pulmonic regurgitation.    Aorta:  The aortic root is normal in size.    Pericardium:  Trivial pericardial effusion.    < end of copied text >    < from: CT Angio Chest PE Protocol w/ IV Cont (22 @ 16:22) >  IMPRESSION:    Mild biapical pneumonitis.  Negative for pulmonary embolism.    < end of copied text >      < from: CT Angio Cardiac w/ IV Cont (22 @ 16:25) >  IMPRESSION:  Cardiac:  1.  The calcium score is mild at 75 Agatston units.  2.  At the right coronary ostium the artery and its most proximal segment   are occluded secondary to calcified and noncalcified plaque.  3.  Findings were discussed with MARGO Byers by Dr. Gallardo on 6/3/2022 at   1710 hours.    Non-cardiac:  1.  The visualized lungs are clear. Included airways are normal. The   visualized segments the mediastinum are normal.    < end of copied text >

## 2022-06-04 NOTE — PROGRESS NOTE ADULT - PROBLEM SELECTOR PLAN 2
Trop peaked at 0.99, CK-MB within normal, currently without any chest pain. Likely 2/2 demand ischemia in the setting of chf exacerbation. EKG with NSR with Q waves in the inferior leads (seen on prior EKG), but now with TWI in the inferolaterally. Received ASA 324mg PO x 1 dose in ED.  - CCTA 6/3: calcium score 75. Right coronary ostium the artery and its most proximal segment are occluded secondary to calcified and noncalcified plaque.  - S/p diagnostic cardiac cath 6/4/22 w/ Dr Leigh: RCA flush occlusion at ostium w/ L-R collaterals. LM no sig disease, LAD/LCx w/ luminal irregularities. LVEDP 6.  - S/p Plavix 600mg load. Will not continue Plavix  - c/w ASA 81mg qd  - Transaminitis improving, will start Lipitor 40mg qd  - c/w home Lisinopril 20mg qd. Will not start BB as pt w/ baseline sinus bradycardia 50s  - Echo as above Trop peaked at 0.99, CK-MB within normal, currently without any chest pain. Likely 2/2 demand ischemia in the setting of chf exacerbation. EKG with NSR with Q waves in the inferior leads (seen on prior EKG), but now with TWI in the inferolaterally. Received ASA 324mg and Plavix 600mg load prior to cath.  - CCTA 6/3: calcium score 75. Right coronary ostium the artery and its most proximal segment are occluded secondary to calcified and noncalcified plaque.  - S/p diagnostic cardiac cath 6/4/22 w/ Dr Leigh: RCA flush occlusion at ostium w/ L-R collaterals. LM no sig disease, LAD/LCx w/ luminal irregularities. LVEDP 6.  - Will c/w medical management  - c/w ASA 81mg qd  - Transaminitis improving, will start Lipitor 40mg qd  - c/w home Lisinopril 20mg qd. Will not start BB as pt w/ baseline sinus bradycardia 50s  - Echo as above

## 2022-06-04 NOTE — PROGRESS NOTE ADULT - PROBLEM SELECTOR PLAN 6
Pt previously on Synthroid 75mcg, but have not taken x 1yr.  - TSH elevated 16.7. T3/T4 WNL.  - restarted Synthroid 75mcg qd  - recommend follow up with Endocrine as outpatient

## 2022-06-04 NOTE — PROVIDER CONTACT NOTE (OTHER) - ASSESSMENT
Notified by tele that pt is bradycardic in the 40s and going in & out of junctional rhythm. Pt denies CP, SOB, palpitations. At 10:15 - /68, HR 47, RR 16, SpO2 100% RA. R radial band c/d/i with +2 palpable pulse.

## 2022-06-04 NOTE — PROGRESS NOTE ADULT - PROBLEM SELECTOR PLAN 7
lifestyle modifications  - HgA1c 5.8, lipid panel w/     #History of pre-diabetes, non-adherent to Metformin at home due to side effects (stomach cramps)  - HgA1c 5.8  - low dose insulin sliding scale

## 2022-06-04 NOTE — CONSULT NOTE ADULT - SUBJECTIVE AND OBJECTIVE BOX
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 Some LE but no orthopnea or PND. Increased her dose of torsemide which did not help. No chest pain. Ruled in for NSTEMI, s/p LHC with complete occlusion of RCA with collaterals. Echocardiogram with new evidence of severe TR and decreased RV function and dilated RV. Pulmonology consulted for evidence of apical ground glass opacities on CT chest in the setting of this shortness of breath.     REVIEW OF SYSTEMS:  Constitutional: No fever, weight loss or fatigue  Eyes: No eye pain, visual disturbances, or discharge  ENMT:  No difficulty hearing, tinnitus, vertigo; No sinus or throat pain  Neck: No pain, stiffness or neck swelling  Respiratory: see HPI  Cardiovascular: No chest pain, palpitations, dizziness or leg swelling  Gastrointestinal: No abdominal or epigastric pain. No nausea, vomiting or hematemesis; No diarrhea or constipation. No melena or hematochezia.  Genitourinary: No dysuria, frequency, hematuria or incontinence  Neurological: No headaches, memory loss, loss of strength, numbness or tremors  Skin: No itching, burning, rashes or lesions   Lymph Nodes: No enlarged glands  Endocrine: No heat or cold intolerance; No hair loss  Musculoskeletal: No joint pain or swelling; No muscle, back or extremity pain  Psychiatric: No depression, anxiety, mood swings or difficulty sleeping  Heme/Lymph: No easy bruising or bleeding gums  Allergy and Immunologic: No hives or eczema    PAST MEDICAL & SURGICAL HISTORY:  Dyspnea and respiratory abnormality  Dyspnea on exertion  Hypothyroidism  Hypothyroid  Essential hypertension  Hypertension  Hodgkin lymphoma  Other postprocedural status  S/P lymph node biopsy  Other postprocedural status  S/P  section    FAMILY HISTORY:  Family history of cardiovascular disease (Mother)  Family history of hypertension    SOCIAL HISTORY:  Smoking Status: [ ] Current, [ ] Former, [X] Never    MEDICATIONS:  Anticoagulants:  aspirin  chewable 81 milliGRAM(s) Oral daily    Endocrine:  atorvastatin 40 milliGRAM(s) Oral at bedtime  dextrose 50% Injectable 25 Gram(s) IV Push once  dextrose 50% Injectable 12.5 Gram(s) IV Push once  dextrose 50% Injectable 25 Gram(s) IV Push once  dextrose Oral Gel 15 Gram(s) Oral once PRN  glucagon  Injectable 1 milliGRAM(s) IntraMuscular once  insulin lispro (ADMELOG) corrective regimen sliding scale   SubCutaneous Before meals and at bedtime  levothyroxine 75 MICROGram(s) Oral daily    Cardiac:  amLODIPine   Tablet 5 milliGRAM(s) Oral daily  lisinopril 20 milliGRAM(s) Oral daily    Other Medications:  dextrose 5%. 1000 milliLiter(s) IV Continuous <Continuous>  dextrose 5%. 1000 milliLiter(s) IV Continuous <Continuous>  ferrous    sulfate 325 milliGRAM(s) Oral daily  sodium chloride 0.9%. 1000 milliLiter(s) IV Continuous <Continuous>    Allergies  No Known Allergies    Vital Signs Last 24 Hrs  T(C): 36.5 (2022 18:30), Max: 36.9 (2022 13:32)  T(F): 97.7 (2022 18:30), Max: 98.4 (2022 13:32)  HR: 52 (2022 16:17) (44 - 65)  BP: 115/63 (2022 16:17) (101/50 - 147/89)  BP(mean): 52 (2022 16:17) (52 - 92)  RR: 16 (2022 16:17) (16 - 18)  SpO2: 100% (2022 16:17) (98% - 100%)    03 @ 07: @ 07:00  --------------------------------------------------------  IN: 780 mL / OUT: 1600 mL / NET: -820 mL     @ 07: @ 20:33  --------------------------------------------------------  IN: 660 mL / OUT: 450 mL / NET: 210 mL    PHYSICAL EXAM:  Constitutional: WD  Head: NC/AT  EENT: anicteric sclera; oropharynx clear, MMM  Neck: supple, no JVD  Respiratory: CTA B/L; no W/R/R  Cardiovascular: +S1/S2, RRR  Gastrointestinal: soft, NT/ND  Extremities: WWP; no clubbing or cyanosis; no edema  Vascular: 2+ radial and pedal pulses  Neurological: alert, oriented    LABS:  CBC Full  -  ( 2022 06:10 )  WBC Count : 7.20 K/uL  RBC Count : 3.80 M/uL  Hemoglobin : 9.4 g/dL  Hematocrit : 29.7 %  Platelet Count - Automated : 258 K/uL  Mean Cell Volume : 78.2 fl  Mean Cell Hemoglobin : 24.7 pg  Mean Cell Hemoglobin Concentration : 31.6 gm/dL  -    138  |  105  |  12  ----------------------------<  104<H>  4.4   |  21<L>  |  0.77    Ca    9.1      2022 06:11  Phos  3.9     06-03  Mg     2.1     06-04    TPro  7.7  /  Alb  3.5  /  TBili  0.4  /  DBili  x   /  AST  37  /  ALT  52<H>  /  AlkPhos  74  06-04    PT/INR - ( 2022 06:10 )   PT: 15.6 sec;   INR: 1.31          PTT - ( 2022 06:10 )  PTT:33.8 sec    RADIOLOGY & ADDITIONAL STUDIES: Personally reviewed.  < from: CT Angio Chest PE Protocol w/ IV Cont (22 @ 16:22) >    HEART: There is mild cardiomegaly. There is coronary arterial   calcification. Negative for aortic valvular or mitral annular   calcification. Negative for intraluminal thrombus within the left atrial   appendage.    PLEURA / PERICARDIUM:  No pleural and no pericardial effusion.    MEDIASTINUM / LYMPH NODES:  Negative for thoracic inlet, axillary,   mediastinal or hilar adenopathy.    THORACIC VASCULATURE: Negative for central or segmental pulmonary   embolism with adequate opacification of pulmonary artery and distal   branches.    LUNG PARENCHYMA / AIRWAYS:  Evaluation of the lung parenchyma   demonstrates no endobronchial lesion. Mild patchy nodular consolidative   opacity within the bilateral upperlobe, overall decreased since 2015.   No pulmonary consolidation or mass.    < end of copied text >

## 2022-06-04 NOTE — PROGRESS NOTE ADULT - PROBLEM SELECTOR PLAN 4
AST/ALT slightly elevated to 47/60 on admission. DDx chf exacerbation and congestive hepatopathy, recent NSTEMI vs. underlying CONDON.  - Improving  - trend CMP daily  - hold hepatotoxic agents

## 2022-06-05 DIAGNOSIS — I38 ENDOCARDITIS, VALVE UNSPECIFIED: ICD-10-CM

## 2022-06-05 DIAGNOSIS — R91.8 OTHER NONSPECIFIC ABNORMAL FINDING OF LUNG FIELD: ICD-10-CM

## 2022-06-05 DIAGNOSIS — I50.32 CHRONIC DIASTOLIC (CONGESTIVE) HEART FAILURE: ICD-10-CM

## 2022-06-05 LAB
ALBUMIN SERPL ELPH-MCNC: 3.5 G/DL — SIGNIFICANT CHANGE UP (ref 3.3–5)
ALP SERPL-CCNC: 67 U/L — SIGNIFICANT CHANGE UP (ref 40–120)
ALT FLD-CCNC: 45 U/L — SIGNIFICANT CHANGE UP (ref 10–45)
ANION GAP SERPL CALC-SCNC: 8 MMOL/L — SIGNIFICANT CHANGE UP (ref 5–17)
AST SERPL-CCNC: 27 U/L — SIGNIFICANT CHANGE UP (ref 10–40)
BILIRUB SERPL-MCNC: 0.2 MG/DL — SIGNIFICANT CHANGE UP (ref 0.2–1.2)
BLD GP AB SCN SERPL QL: NEGATIVE — SIGNIFICANT CHANGE UP
BUN SERPL-MCNC: 12 MG/DL — SIGNIFICANT CHANGE UP (ref 7–23)
CALCIUM SERPL-MCNC: 9 MG/DL — SIGNIFICANT CHANGE UP (ref 8.4–10.5)
CHLORIDE SERPL-SCNC: 105 MMOL/L — SIGNIFICANT CHANGE UP (ref 96–108)
CO2 SERPL-SCNC: 23 MMOL/L — SIGNIFICANT CHANGE UP (ref 22–31)
CREAT SERPL-MCNC: 0.86 MG/DL — SIGNIFICANT CHANGE UP (ref 0.5–1.3)
EGFR: 86 ML/MIN/1.73M2 — SIGNIFICANT CHANGE UP
GLUCOSE BLDC GLUCOMTR-MCNC: 140 MG/DL — HIGH (ref 70–99)
GLUCOSE SERPL-MCNC: 117 MG/DL — HIGH (ref 70–99)
HCT VFR BLD CALC: 28.5 % — LOW (ref 34.5–45)
HGB BLD-MCNC: 8.8 G/DL — LOW (ref 11.5–15.5)
MAGNESIUM SERPL-MCNC: 2 MG/DL — SIGNIFICANT CHANGE UP (ref 1.6–2.6)
MCHC RBC-ENTMCNC: 24.3 PG — LOW (ref 27–34)
MCHC RBC-ENTMCNC: 30.9 GM/DL — LOW (ref 32–36)
MCV RBC AUTO: 78.7 FL — LOW (ref 80–100)
NRBC # BLD: 0 /100 WBCS — SIGNIFICANT CHANGE UP (ref 0–0)
PLATELET # BLD AUTO: 227 K/UL — SIGNIFICANT CHANGE UP (ref 150–400)
POTASSIUM SERPL-MCNC: 4.3 MMOL/L — SIGNIFICANT CHANGE UP (ref 3.5–5.3)
POTASSIUM SERPL-SCNC: 4.3 MMOL/L — SIGNIFICANT CHANGE UP (ref 3.5–5.3)
PROT SERPL-MCNC: 7.2 G/DL — SIGNIFICANT CHANGE UP (ref 6–8.3)
RBC # BLD: 3.62 M/UL — LOW (ref 3.8–5.2)
RBC # FLD: 16.2 % — HIGH (ref 10.3–14.5)
RH IG SCN BLD-IMP: POSITIVE — SIGNIFICANT CHANGE UP
SODIUM SERPL-SCNC: 136 MMOL/L — SIGNIFICANT CHANGE UP (ref 135–145)
WBC # BLD: 6.95 K/UL — SIGNIFICANT CHANGE UP (ref 3.8–10.5)
WBC # FLD AUTO: 6.95 K/UL — SIGNIFICANT CHANGE UP (ref 3.8–10.5)

## 2022-06-05 RX ORDER — ASPIRIN/CALCIUM CARB/MAGNESIUM 324 MG
81 TABLET ORAL DAILY
Refills: 0 | Status: DISCONTINUED | OUTPATIENT
Start: 2022-06-05 | End: 2022-06-07

## 2022-06-05 RX ORDER — IRON SUCROSE 20 MG/ML
300 INJECTION, SOLUTION INTRAVENOUS EVERY 24 HOURS
Refills: 0 | Status: COMPLETED | OUTPATIENT
Start: 2022-06-05 | End: 2022-06-07

## 2022-06-05 RX ORDER — LEVOTHYROXINE SODIUM 125 MCG
1 TABLET ORAL
Qty: 0 | Refills: 0 | DISCHARGE

## 2022-06-05 RX ORDER — METFORMIN HYDROCHLORIDE 850 MG/1
1 TABLET ORAL
Qty: 0 | Refills: 0 | DISCHARGE

## 2022-06-05 RX ADMIN — ATORVASTATIN CALCIUM 40 MILLIGRAM(S): 80 TABLET, FILM COATED ORAL at 21:08

## 2022-06-05 RX ADMIN — IRON SUCROSE 176.67 MILLIGRAM(S): 20 INJECTION, SOLUTION INTRAVENOUS at 11:55

## 2022-06-05 RX ADMIN — Medication 75 MICROGRAM(S): at 05:44

## 2022-06-05 RX ADMIN — Medication 325 MILLIGRAM(S): at 11:55

## 2022-06-05 RX ADMIN — LISINOPRIL 20 MILLIGRAM(S): 2.5 TABLET ORAL at 05:44

## 2022-06-05 RX ADMIN — AMLODIPINE BESYLATE 5 MILLIGRAM(S): 2.5 TABLET ORAL at 05:43

## 2022-06-05 RX ADMIN — Medication 81 MILLIGRAM(S): at 11:55

## 2022-06-05 RX ADMIN — ENOXAPARIN SODIUM 40 MILLIGRAM(S): 100 INJECTION SUBCUTANEOUS at 05:44

## 2022-06-05 NOTE — PROGRESS NOTE ADULT - ASSESSMENT
41F with PMHx of post-partum cardiomyopathy/chronic HF w/recovered EF (30% --> 45-50%), HTN, HLD, pre-eclampsia, non-hodgkin's lymphoma (s/p chemo/radiation ~15yrs ago with bleomycin, in remission, complicated by pulmonary toxicity), pre-diabetes, hypothyroidism, presented to [facility] ER 6/2 PM with SOB w/ minimal exertion and LE swelling x1 week, found to have elevated troponin (peak 0.99) and admitted to cardiac tele with concern for NSTEMI, now s/p cardiac cath 6/4 revealing 1VCAD (RCA  w/L-R collaterals, medically managed) and echo revealing EF 45-50%, dilated RV, severe TR/moderate MR, prompting structural heart consult and plan for RHC 6/6.    41F with PMHx of post-partum cardiomyopathy/chronic HF w/recovered EF (30% --> 45-50%), HTN, HLD, pre-eclampsia, non-hodgkin's lymphoma (s/p chemo/radiation ~15yrs ago with bleomycin, in remission, complicated by pulmonary toxicity), pre-diabetes, hypothyroidism, presented to [facility] ER 6/2 PM with SOB w/ minimal exertion and LE swelling x1 week, found to have elevated troponin (peak 0.99) and admitted to cardiac tele with concern for NSTEMI, now s/p cardiac cath 6/4 revealing 1VCAD (RCA  w/L-R collaterals, medically managed) and echo revealing EF 45-50%, dilated RV, severe TR/moderate MR, prompting structural heart consult.

## 2022-06-05 NOTE — PROGRESS NOTE ADULT - PROBLEM SELECTOR PLAN 5
Pt with h/o pre-eclampsia and long standing HTN  -on arrival, was hypertensive with SBP 160s  - SBP currently 110-120s  - c/w home Amlodipine 5mg qd and Lisinopril 20 mg qd.

## 2022-06-05 NOTE — PROGRESS NOTE ADULT - PROBLEM SELECTOR PLAN 6
-Pt reports chronic anemia since childhood, Hgb baseline appears to range from 9-11.3. No recent bleeding. Pt takes iron supplement at home  -Hgb 8s-9s this admission, MCV 78  -iron studies showing ISAT 8%, ferritin 21 consistent with iron deficiency  -start IV iron -Pt reports chronic anemia since childhood, Hgb baseline appears to range from 9-11.3. No recent bleeding. Pt takes iron supplement at home  -Hgb 8s-9s this admission, MCV 78  -iron studies showing ISAT 8%, ferritin 21 consistent with iron deficiency  -start IV iron 300mg x3 days

## 2022-06-05 NOTE — PROGRESS NOTE ADULT - PROBLEM SELECTOR PLAN 7
-Apical groundglass opacities and scarring noted on CT chest, stable/improved since 2015 and do not explain her symptoms per pulm (Dr. Sanders  -follow up with Dr. Sanders outpatient in 4-6 weeks to follow up on the chronically abnormal CT chest and to obtain PFTs

## 2022-06-05 NOTE — PROGRESS NOTE ADULT - SUBJECTIVE AND OBJECTIVE BOX
OVERNIGHT EVENTS:  No acute events overnight.    SUBJECTIVE / INTERVAL HPI: Patient seen and examined at bedside. Still c/o SOB with minimal activity but comfortable at rest. Denies CP, dizziness/lightheadedness, palpitations.     VITAL SIGNS:  Vital Signs Last 24 Hrs  T(C): 35.9 (05 Jun 2022 06:17), Max: 36.9 (04 Jun 2022 13:32)  T(F): 96.6 (05 Jun 2022 06:17), Max: 98.4 (04 Jun 2022 13:32)  HR: 51 (05 Jun 2022 09:00) (44 - 65)  BP: 99/60 (05 Jun 2022 09:00) (99/60 - 147/89)  BP(mean): 85 (05 Jun 2022 05:40) (52 - 92)  RR: 16 (05 Jun 2022 09:00) (16 - 18)  SpO2: 97% (05 Jun 2022 09:00) (97% - 100%)      I&O's Summary    04 Jun 2022 07:01  -  05 Jun 2022 07:00  --------------------------------------------------------  IN: 660 mL / OUT: 750 mL / NET: -90 mL    PHYSICAL EXAM:  General: sitting up in bed, NAD  HEENT: conjunctiva clear; MMM  Neck: supple, no JVD  Cardiovascular: RRR, systolic murmur  Respiratory: CTA B/L  Gastrointestinal: soft, NT/ND, +BS  Extremities: WWP, no edema or cyanosis  Vascular: Peripheral pulses palpable 2+ bilaterally. R radial access site stable, no hematoma  Neurological: AAOx3, no focal deficits    MEDICATIONS:  MEDICATIONS  (STANDING):  amLODIPine   Tablet 5 milliGRAM(s) Oral daily  aspirin enteric coated 81 milliGRAM(s) Oral daily  atorvastatin 40 milliGRAM(s) Oral at bedtime  enoxaparin Injectable 40 milliGRAM(s) SubCutaneous every 24 hours  ferrous    sulfate 325 milliGRAM(s) Oral daily  iron sucrose IVPB 300 milliGRAM(s) IV Intermittent every 24 hours  levothyroxine 75 MICROGram(s) Oral daily  lisinopril 20 milliGRAM(s) Oral daily    MEDICATIONS  (PRN):      LABS:                        8.8    6.95  )-----------( 227      ( 05 Jun 2022 05:49 )             28.5       06-05    136  |  105  |  12  ----------------------------<  117<H>  4.3   |  23  |  0.86    Ca    9.0      05 Jun 2022 05:49  Mg     2.0     06-05    TPro  7.2  /  Alb  3.5  /  TBili  0.2  /  DBili  x   /  AST  27  /  ALT  45  /  AlkPhos  67  06-05      PT/INR - ( 04 Jun 2022 06:10 )   PT: 15.6 sec;   INR: 1.31          PTT - ( 04 Jun 2022 06:10 )  PTT:33.8 sec          TELEMETRY: sinus rhythm/sinus bradycardia    RADIOLOGY & ADDITIONAL TESTS: Reviewed.

## 2022-06-05 NOTE — PROGRESS NOTE ADULT - PROBLEM SELECTOR PLAN 10
#DVT PPx- SQ lovenox    #Dispo- pending RHC, structural heart recs  No anticipated SW needs  Med rec done via Surescripts + pt's verbal report    Case d/w Dr. Yuen #DVT PPx- SQ lovenox (holding in case of RHC Monday)    #Dispo- pending work-up as above  No anticipated SW needs  Med rec done via Surescripts + pt's verbal report    Case d/w Dr. Yuen

## 2022-06-05 NOTE — PROGRESS NOTE ADULT - PROBLEM SELECTOR PLAN 3
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 (30% --> 45-50%)  -Currently euvolemic on exam  -Chest imaging without pulm edema,   -ECHO 6/3: EF 45-50%, mildly dilated LV, dilated RV/reduced RVSF, biatrial dilation, mild-mod AI, mild AS, mod MR, severe TR, PASP 36 mmHg (may be underestimated given severity of TR)  -daily standing weights, strict I/Os, core measures, <2g Na diet  -s/p Lasix 40mg IV x2 doses on admit, though clinically not volume overloaded and no subjective improvement  -continue home lisinopril  -no beta blocker as pt sinus oscar 50s, 40s at night 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 (30% --> 45-50%)  -Currently euvolemic on exam  -Chest imaging without pulm edema,   -ECHO 6/3: EF 45-50%, mildly dilated LV, dilated RV/reduced RVSF, biatrial dilation, mild-mod AI, mild AS, mod MR, severe TR, PASP 36 mmHg (may be underestimated given severity of TR)  -daily standing weights, strict I/Os, core measures, <2g Na diet  -s/p Lasix 40mg IV x2 doses on admit, though clinically not volume overloaded and no subjective improvement  -continue home lisinopril  -no beta blocker as pt sinus oscar 50s, 40s at night  -consider Advanced CHF consult Monday

## 2022-06-05 NOTE — PROGRESS NOTE ADULT - PROBLEM SELECTOR PLAN 1
Pt presenting with progressive dyspnea w/ minimal exertion, palpitations, and michael LE swelling x 1 week. May be related to valvular disease, CHF, ?pulmonary HTN, CAD/NSTEMI, anemia  -Euvolemic on exam  -CTA chest negative for PE. No pulmonary edema. Apical groundglass opacities and scarring, stable/chronic per pulm and would not explain new BRASWELL.  - D-dimer 720, LE dopplers negative for DVT  - ECHO 6/3 EF 45-50%, worsened valvular disease (see below)  - s/p Lasix 40mg IV x 2 doses on admit, though clinically not volume overloaded and no subjective improvement  - see management of valvular disease/CHF below

## 2022-06-05 NOTE — PROGRESS NOTE ADULT - PROBLEM SELECTOR PLAN 2
CP-free, Trop peaked at 0.99   -EKG NSR with Q waves in the inferior leads (seen on prior EKG), but now with TWI inferolaterally.   - S/p diagnostic cardiac cath 6/4/22 w/ Dr Leigh: RCA flush occlusion at ostium w/ L-R collaterals. LM no sig disease, LAD/LCx w/ luminal irregularities. LVEDP 6  - , A1c 5.8%  - c/w medical management- aspirin 81mg daily, atorvastatin 40mg daily  - c/w home Lisinopril and amlodipine  - will not start BB as pt w/ baseline sinus bradycardia 50s

## 2022-06-05 NOTE — PROGRESS NOTE ADULT - PROBLEM SELECTOR PLAN 4
-TTE 6/3 revealed severe unrestricted TR, moderate MR (MV mildly thickened), mild-mod AR, mild AS  -Structural heart consulted, f/u recs  -plan for RHC Monday 6/6 -TTE 6/3 revealed severe unrestricted TR, moderate MR (MV mildly thickened), mild-mod AR, mild AS  -Structural heart (Dr. Sousa/Gabby) consulted, recommend Advanced CHF consult, possible RHC

## 2022-06-05 NOTE — CHART NOTE - NSCHARTNOTEFT_GEN_A_CORE
Assessment:  41y Female, PMH postpartum cardiomyopathy (diagnoses 2001, echo EF 45%), pre-eclampsia, NHL in 1998 s/p chemo and radiation treatment (c/b pulmonary toxicity 2/2 bleomycin) now in remission, hypertension, pre-diabetes, mild AS, hypothyroidism, presents with progressively worsening dyspnea on exertion, palpitations, and LE swelling over the past week. In ED she was noted to have elevated troponin and D-Dimer and was admitted to cardiology for possible CHF exacerbation in setting of NSTEMI. CT chest PE protocol revealed no PE. TTE 6/3/22 revealed Mild-to-moderate aortic regurgitation, Mild aortic stenosis, Moderate mitral regurgitation, and severe, unrestricted tricuspid regurgitation, EF 45%. On 6/4/22 she underwent a diagnostic cardiac catheterization with Dr. Leigh showing RCA flush occlusion at ostium w/ L-R collaterals and no intervention done. Structural heart team was consulted for evaluation of severe TR and moderate MR.    Plan:  Problem 1: severe TR and moderate MR  -Discussed with Dr. Sousa and Dr. Pierre.  -Imaging reviewed.  -TTE 6/3/22: Mildly dilated left ventricular size. Mildly reduced left ventricular systolic function. Dilated right ventricular size. Reduced right ventricular systolic function. Biatrial enlargement. Mild-to-moderate aortic regurgitation. Mild aortic stenosis. Moderate mitral regurgitation. Severe, unrestricted tricuspid regurgitation. Trivial pericardial effusion.  -Received IV lasix with no improvement of her symptoms.  -Please get Heart failure consult.  -Agree with pulmonology consult.  -No need for immediate intervention at this time. Continue medical management.  -Please have patient follow up with Dr. Sousa in the office. Please have her call [phone] and schedule an appointment within 1 week of discharge.    Problem 2: NSTEMI  -s/p diagnostic cardiac cath 6/4/22 w/ Dr Leigh: RCA flush occlusion at ostium w/ L-R collaterals. LM no sig disease, LAD/LCx w/ luminal irregularities. LVEDP 6.   -continue ASA  -continue atorvastatin given improvement of transaminitis  -Care per primary team    Problem 3: HTN  -continue home lisinopril  -continue norvasc  -Care per primary team    Problem 4: Hypothyroidism  -has not taken levothyroxine in over a year  -TSH: 16.70, T3: WNL at 97  -restart levothyroxine 75mcg daily  -Care per primary team    I have reviewed clinical labs tests and reports, radiology tests and reports, as well as old patient medical records, and discussed with the referring physician and structural heart attending.

## 2022-06-05 NOTE — PROGRESS NOTE ADULT - PROBLEM SELECTOR PLAN 9
#pre-diabetes  - A1c 5.8%  - pt prescribed metformin but not taking due to GI side effects  - Nutrition consult  - lifestyle modifications

## 2022-06-06 DIAGNOSIS — I07.1 RHEUMATIC TRICUSPID INSUFFICIENCY: ICD-10-CM

## 2022-06-06 DIAGNOSIS — I25.10 ATHEROSCLEROTIC HEART DISEASE OF NATIVE CORONARY ARTERY WITHOUT ANGINA PECTORIS: ICD-10-CM

## 2022-06-06 DIAGNOSIS — I42.9 CARDIOMYOPATHY, UNSPECIFIED: ICD-10-CM

## 2022-06-06 DIAGNOSIS — I51.9 HEART DISEASE, UNSPECIFIED: ICD-10-CM

## 2022-06-06 LAB
ALBUMIN SERPL ELPH-MCNC: 3.5 G/DL — SIGNIFICANT CHANGE UP (ref 3.3–5)
ALP SERPL-CCNC: 64 U/L — SIGNIFICANT CHANGE UP (ref 40–120)
ALT FLD-CCNC: 41 U/L — SIGNIFICANT CHANGE UP (ref 10–45)
ANION GAP SERPL CALC-SCNC: 9 MMOL/L — SIGNIFICANT CHANGE UP (ref 5–17)
APTT BLD: 35.9 SEC — HIGH (ref 27.5–35.5)
AST SERPL-CCNC: 26 U/L — SIGNIFICANT CHANGE UP (ref 10–40)
BILIRUB SERPL-MCNC: 0.3 MG/DL — SIGNIFICANT CHANGE UP (ref 0.2–1.2)
BUN SERPL-MCNC: 10 MG/DL — SIGNIFICANT CHANGE UP (ref 7–23)
CALCIUM SERPL-MCNC: 8.9 MG/DL — SIGNIFICANT CHANGE UP (ref 8.4–10.5)
CHLORIDE SERPL-SCNC: 107 MMOL/L — SIGNIFICANT CHANGE UP (ref 96–108)
CO2 SERPL-SCNC: 21 MMOL/L — LOW (ref 22–31)
CREAT SERPL-MCNC: 0.78 MG/DL — SIGNIFICANT CHANGE UP (ref 0.5–1.3)
EGFR: 97 ML/MIN/1.73M2 — SIGNIFICANT CHANGE UP
GLUCOSE SERPL-MCNC: 98 MG/DL — SIGNIFICANT CHANGE UP (ref 70–99)
HCT VFR BLD CALC: 28.1 % — LOW (ref 34.5–45)
HGB BLD-MCNC: 8.9 G/DL — LOW (ref 11.5–15.5)
INR BLD: 1.34 — HIGH (ref 0.88–1.16)
MAGNESIUM SERPL-MCNC: 2 MG/DL — SIGNIFICANT CHANGE UP (ref 1.6–2.6)
MCHC RBC-ENTMCNC: 25.1 PG — LOW (ref 27–34)
MCHC RBC-ENTMCNC: 31.7 GM/DL — LOW (ref 32–36)
MCV RBC AUTO: 79.2 FL — LOW (ref 80–100)
NRBC # BLD: 0 /100 WBCS — SIGNIFICANT CHANGE UP (ref 0–0)
PLATELET # BLD AUTO: 229 K/UL — SIGNIFICANT CHANGE UP (ref 150–400)
POTASSIUM SERPL-MCNC: 4.3 MMOL/L — SIGNIFICANT CHANGE UP (ref 3.5–5.3)
POTASSIUM SERPL-SCNC: 4.3 MMOL/L — SIGNIFICANT CHANGE UP (ref 3.5–5.3)
PROT SERPL-MCNC: 7.2 G/DL — SIGNIFICANT CHANGE UP (ref 6–8.3)
PROTHROM AB SERPL-ACNC: 16 SEC — HIGH (ref 10.5–13.4)
RBC # BLD: 3.55 M/UL — LOW (ref 3.8–5.2)
RBC # FLD: 16.2 % — HIGH (ref 10.3–14.5)
SODIUM SERPL-SCNC: 137 MMOL/L — SIGNIFICANT CHANGE UP (ref 135–145)
WBC # BLD: 8.86 K/UL — SIGNIFICANT CHANGE UP (ref 3.8–10.5)
WBC # FLD AUTO: 8.86 K/UL — SIGNIFICANT CHANGE UP (ref 3.8–10.5)

## 2022-06-06 PROCEDURE — 99152 MOD SED SAME PHYS/QHP 5/>YRS: CPT

## 2022-06-06 PROCEDURE — 93451 RIGHT HEART CATH: CPT | Mod: 26

## 2022-06-06 PROCEDURE — 99223 1ST HOSP IP/OBS HIGH 75: CPT | Mod: GC

## 2022-06-06 PROCEDURE — 99233 SBSQ HOSP IP/OBS HIGH 50: CPT

## 2022-06-06 RX ORDER — DAPAGLIFLOZIN 10 MG/1
1 TABLET, FILM COATED ORAL
Qty: 30 | Refills: 0
Start: 2022-06-06 | End: 2022-07-05

## 2022-06-06 RX ORDER — LISINOPRIL 2.5 MG/1
20 TABLET ORAL DAILY
Refills: 0 | Status: DISCONTINUED | OUTPATIENT
Start: 2022-06-07 | End: 2022-06-07

## 2022-06-06 RX ORDER — DIPHENHYDRAMINE HCL 50 MG
25 CAPSULE ORAL ONCE
Refills: 0 | Status: DISCONTINUED | OUTPATIENT
Start: 2022-06-06 | End: 2022-06-06

## 2022-06-06 RX ORDER — SACUBITRIL AND VALSARTAN 24; 26 MG/1; MG/1
1 TABLET, FILM COATED ORAL
Qty: 60 | Refills: 0
Start: 2022-06-06 | End: 2022-07-05

## 2022-06-06 RX ADMIN — Medication 325 MILLIGRAM(S): at 12:50

## 2022-06-06 RX ADMIN — IRON SUCROSE 176.67 MILLIGRAM(S): 20 INJECTION, SOLUTION INTRAVENOUS at 11:01

## 2022-06-06 RX ADMIN — Medication 81 MILLIGRAM(S): at 12:51

## 2022-06-06 RX ADMIN — Medication 75 MICROGRAM(S): at 05:39

## 2022-06-06 RX ADMIN — ATORVASTATIN CALCIUM 40 MILLIGRAM(S): 80 TABLET, FILM COATED ORAL at 21:16

## 2022-06-06 RX ADMIN — LISINOPRIL 20 MILLIGRAM(S): 2.5 TABLET ORAL at 05:39

## 2022-06-06 RX ADMIN — AMLODIPINE BESYLATE 5 MILLIGRAM(S): 2.5 TABLET ORAL at 05:39

## 2022-06-06 NOTE — CONSULT NOTE ADULT - PROBLEM SELECTOR RECOMMENDATION 9
Noted new RV dysfunction on TTE (compared to previous), likely in setting of oRCA total occlusion. hx of bleomycin pulm tox with reduced pulm function testing but has been stable over many years.   - Currently euvolemic, no diuertics  - Obtain RHC today Noted new RV dysfunction on TTE (compared to previous), likely in setting of oRCA total occlusion. hx of bleomycin pulm tox with reduced pulm function testing but has been stable over many years.   - Currently euvolemic, no diuertics  - Obtain RHC today, pending findings for further management

## 2022-06-06 NOTE — PROGRESS NOTE ADULT - PROBLEM SELECTOR PROBLEM 9
Obesity, Class I, BMI 30.0-34.9 (see actual BMI)
Prophylactic measure
Obesity, Class I, BMI 30.0-34.9 (see actual BMI)

## 2022-06-06 NOTE — CONSULT NOTE ADULT - PROBLEM SELECTOR RECOMMENDATION 3
Etiology: Post-Partum cardiomyopathy diagnosed initially in 2001, Worsened after pregnancy in 2014. Of note hx of doxorubicin use from NHL.   Diuretic: Off diuretics.   GDMT: Currently on lisinopril 20mg, amlodipine 5mg for BP. Start SGLT2 - based on what is covered with insurance Farxiga vs Jardiance.  Device: Not currently  - RHC as above, for further recs.

## 2022-06-06 NOTE — PROGRESS NOTE ADULT - PROBLEM SELECTOR PLAN 1
Pt presenting with progressive dyspnea w/ minimal exertion, palpitations, and michael LE swelling x 1 week. May be related to valvular disease, CHF, ?pulmonary HTN, CAD/NSTEMI, anemia  -Euvolemic on exam  -CTA chest negative for PE. No pulmonary edema. Apical groundglass opacities and scarring, stable/chronic per pulm and would not explain new BRASWELL.  - D-dimer 720, LE dopplers negative for DVT  - ECHO 6/3 EF 45-50%, worsened valvular disease (see below)  - s/p Lasix 40mg IV x 2 doses on admit, though clinically not volume overloaded and no subjective improvement  - see management of valvular disease/CHF below P/W BRASWELL and BL LE edema.   - Etiology 2/2 valvular disease vs. CHF vs. CAD/NSTEMI   - CTA chest negative for PE. No pulmonary edema. Apical ground glass opacities and scarring, stable/chronic per pulm and would not explain new BRASWELL.  - D-dimer 720, LE dopplers negative for DVT  - ECHO 6/3 EF 45-50%, worsened valvular disease (see below)  - s/p Lasix 40mg IV x 2 doses on admit, though clinically not volume overloaded and no subjective improvement; see management of valvular disease/CHF below

## 2022-06-06 NOTE — PROGRESS NOTE ADULT - PROBLEM SELECTOR PLAN 5
Pt with h/o pre-eclampsia and long standing HTN  -on arrival, was hypertensive with SBP 160s  - SBP currently 110-120s  - c/w home Amlodipine 5mg qd and Lisinopril 20 mg qd. - SBP 140s  - c/w Amlodipine 5mg and Lisinopril 20mg

## 2022-06-06 NOTE — PROGRESS NOTE ADULT - NS ATTEND AMEND GEN_ALL_CORE FT
41F with post-partum cardiomyopathy/chronic HFrecEF (30% --> 45-50%), HTN, HLD, pre-eclampsia, non-hodgkin's lymphoma (s/p chemo/radiation ~15yrs ago with bleomycin, in remission, complicated by pulmonary toxicity), pre-diabetes, hypothyroidism presented to ED 6/3/22 w/SOB and BL LE edema, found to have NSTEMI, now s/p diagnostic cath with plan for RHC  ROS: Patient reports BRASWELL, no SOB at rest.   Physical Exam notable for: elderly patient laying in bed in NAD, flat neck veins, RRR, no MGR detected, clear lungs, overly nourished abdomen, no fluid wave detected, no pretibial pitting edema, no ankle edema, skin WWP, A&Ox3, independently ambulatory with no assistance  Plan for:  RHC pending  Ambulatory sat test passed, xochilt O2 sat 96% on RA  Farxiga sent to [employer] to check coverage  Entresto sent to [employer] to check coverage  Holding ACEi in anticipation of potential transition to [facility]  Screening for SPIRRIT trial  Advanced CHF consult for co-management   Dietician/Nutrition consult for CHF/cardiac diet reinforcement  Future planning: patient to have 1wk f/u appt made with Cardiologist for quality improvement CHF readmission risk reduction  Lukas Damian M.D.  Cardiology Attending

## 2022-06-06 NOTE — CONSULT NOTE ADULT - ASSESSMENT
ASSESSMENT:  41F PMH Post partum cardiomyopathy, NHL (ABVD- including doxorubicin, Bleomycin- with Pulm Toxicity) presenting for worsening dyspnea/palpitations with exertion and LE edema admitted initially with NSTEMI. Found to have new reduced RV and severe TR with mod MR, undergoing evaluation of valvular disease with structural team and advanced HF consulted for further optimization.    Cardiac Studies:,   6/6/22: Mildly reduced LV function, 45%, LVIDD 5.95, IVS 0.99, PW 0.88cm, dilated RV and reduced RV function. TAPSE 12mmS' 8cm/s,  Biatrial enlargement, mild to mod AI, mild AS, MOd MR, Severe unrestricted TR with trivial effusion.  LHC 6/4/22: oRCA flush total occlusion L-R occlusion, mild luminal irregularities of remaining vessels. 
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 Pulmonary consulted for abnormal CT and shortness of breath.    #Shortness of breath  #Severe TR  #Apical groundglass opacities and scarring    Patient with remote history of bleomycin use and remote history of pneumonitis has an abnormal CT chest with apical scarring. When compared with CT chest images from 2015, there are areas of cystic changes and GGO, overall improved from 2015 but some persistent GGO and evidence of scarring now. These changes may represent scarring from prior pneumonitis, or may represent an organising process such as organising pneumonia. Nonetheless, they have been present for over 8 years and have been stable or improved and do not explain her symptoms. We can work these up outpatient with repeat CT chest in few months.     There is no evidence of pulmonary etiology of shortness of breath. Likely BRASWELL is related to new severe TR, and though PASP is only noted to be mildly elevated at 36, the severe TR underestimates significantly this PASP. There is evidence of R heart dysfunction on the echocardiogram. Would recommend continued cardiac workup for this as per primary team.    Recommend:  - Please ensure patient has follow up with Dr. Sanders outpatient in 4-6 weeks to follow up on the chronically abnormal CT chest  - Continued workup of R sided heart dysfunction and severe TR per cardiology primary team    Patient s/e/d with attending Dr. Sanders.
Assessment:  41y Female, PMH postpartum cardiomyopathy (diagnoses 2001, echo EF 45%), pre-eclampsia, NHL in 1998 s/p chemo and radiation treatment (c/b pulmonary toxicity 2/2 bleomycin) now in remission, hypertension, pre-diabetes, mild AS, hypothyroidism, presents with progressively worsening dyspnea on exertion, palpitations, and LE swelling over the past week. In ED she was noted to have elevated troponin and D-Dimer and was admitted to cardiology for possible CHF exacerbation in setting of NSTEMI. CT chest PE protocol revealed no PE. TTE 6/3/22 revealed Mild-to-moderate aortic regurgitation, Mild aortic stenosis, Moderate mitral regurgitation, and severe, unrestricted tricuspid regurgitation, EF 45%. On 6/4/22 she underwent a diagnostic cardiac catheterization with Dr. Leigh showing RCA flush occlusion at ostium w/ L-R collaterals and no intervention done. Structural heart team was consulted for evaluation of severe TR and moderate MR.    Plan:  Problem 1: severe TR and moderate MR  -Discussed with Dr. Sousa and Dr. Pierre.  -Dr. Sousa to review echo imaging, plan pending.  -TTE 6/3/22: Mildly dilated left ventricular size. Mildly reduced left ventricular systolic function. Dilated right ventricular size. Reduced right ventricular systolic function. Biatrial enlargement. Mild-to-moderate aortic regurgitation. Mild aortic stenosis. Moderate mitral regurgitation. Severe, unrestricted tricuspid regurgitation. Trivial pericardial effusion.  -Received IV lasix with no improvement of her symptoms.  -Please get Heart failure consult.  -Agree with pulmonology consult.  -Structural heart team will continue to follow.    Problem 2: NSTEMI  -s/p diagnostic cardiac cath 6/4/22 w/ Dr Leigh: RCA flush occlusion at ostium w/ L-R collaterals. LM no sig disease, LAD/LCx w/ luminal irregularities. LVEDP 6.   -continue ASA  -start atorvastatin given improvement of transaminitis  -Care per primary team    Problem 3: HTN  -continue home lisinopril  -Care per primary team    Problem 4: Hypothyroidism  -has not taken levothyroxine in over a year  -TSH: 16.70, T3: WNL at 97  -restart levothyroxine 75mcg daily  -Care per primary team    I have reviewed clinical labs tests and reports, radiology tests and reports, as well as old patient medical records, and discussed with the referring physician and structural heart attending.

## 2022-06-06 NOTE — PROGRESS NOTE ADULT - PROBLEM SELECTOR PROBLEM 7
Ground glass opacity present on imaging of lung
Obesity, Class I, BMI 30.0-34.9 (see actual BMI)
Ground glass opacity present on imaging of lung

## 2022-06-06 NOTE — CONSULT NOTE ADULT - SUBJECTIVE AND OBJECTIVE BOX
Heart Failure Consult Note    HPI:  41F PMH Post partum cardiomyopathy, NHL (ABVD- including doxorubicin, Bleomycin- with Pulm Toxicity) presenting for worsening dyspnea/palpitations with exertion and LE edema. Patient states at baseline can walk several blocks- 4 to drop off her so but since Last  has been progressively worsening to the point that she has to stop to catch her breath when she walks to the bathroom. Patient reports compliance with with diuretic/medications. Attempted extra dose of torsemide without relief of symptoms. Reports + orthopnea. Denies chest discomfort, denies fevers, chills, nausea. Patient admitted for NSTEMI- noted elevated trop ST depression with q wave in inferior leads. Received IV diuresis x2 40mg IV lasix. Underwent LHC demonstrated ostial flush total occlusion of RCA with L-R collateral, otherwise luminal irregularities of remaining vessels, EDP 6.     Meds: Lisinopril 20mg, Lipitor 20, torsemide 5, amlodipine 5  Social: Denies tobacco, social ETOH  FHx: Denies CAD or CM.     PAST MEDICAL & SURGICAL HISTORY:  Dyspnea and respiratory abnormality  Dyspnea on exertion    Hypothyroidism  Hypothyroid    Essential hypertension  Hypertension    Hodgkin&#x27;s disease  Hodgkin lymphoma    Other postprocedural status  S/P lymph node biopsy    Other postprocedural status  S/P  section        MEDICATIONS  (STANDING):  amLODIPine   Tablet 5 milliGRAM(s) Oral daily  aspirin enteric coated 81 milliGRAM(s) Oral daily  atorvastatin 40 milliGRAM(s) Oral at bedtime  ferrous    sulfate 325 milliGRAM(s) Oral daily  iron sucrose IVPB 300 milliGRAM(s) IV Intermittent every 24 hours  levothyroxine 75 MICROGram(s) Oral daily  lisinopril 20 milliGRAM(s) Oral daily    MEDICATIONS  (PRN):    Vital Signs Last 24 Hrs  T(C): 37.1 (2022 13:33), Max: 37.1 (2022 13:33)  T(F): 98.7 (2022 13:33), Max: 98.7 (2022 13:33)  HR: 64 (2022 09:32) (62 - 65)  BP: 134/77 (2022 09:32) (110/58 - 146/74)  BP(mean): 84 (2022 05:36) (84 - 98)  RR: 18 (2022 09:32) (16 - 18)  SpO2: 100% (2022 05:36) (98% - 100%)    PHYSICAL EXAM:  GEN: Awake, alert. NAD.   HEENT: NCAT, PERRL, EOMI. Mucosa moist. No JVD.  RESP: CTA b/l  CV: RRR. Normal S1/S2. No m/r/g.  ABD: Soft. NT/ND. BS+  EXT: Warm. No edema, clubbing, or cyanosis.   NEURO: AAOx3. No focal deficits.     LABS:                        8.9    8.86  )-----------( 229      ( 2022 07:42 )             28.1     06-    137  |  107  |  10  ----------------------------<  98  4.3   |  21<L>  |  0.78    Ca    8.9      2022 07:42  Mg     2.0     -    TPro  7.2  /  Alb  3.5  /  TBili  0.3  /  DBili  x   /  AST  26  /  ALT  41  /  AlkPhos  64  06-06        PT/INR - ( 2022 07:42 )   PT: 16.0 sec;   INR: 1.34          PTT - ( 2022 07:42 )  PTT:35.9 sec    I&O's Summary    2022 07:01  -  2022 07:00  --------------------------------------------------------  IN: 730 mL / OUT: 200 mL / NET: 530 mL    2022 07:01  -  2022 13:57  --------------------------------------------------------  IN: 180 mL / OUT: 0 mL / NET: 180 mL      RADIOLOGY & ADDITIONAL STUDIES:    EKG:         Heart Failure Consult Note    HPI:  41F PMH Post partum cardiomyopathy, NHL (ABVD- including doxorubicin, Bleomycin- with Pulm Toxicity) presenting for worsening dyspnea/palpitations with exertion and LE edema. Patient states at baseline can walk several blocks- 4 to drop off her so but since Last  has been progressively worsening to the point that she has to stop to catch her breath when she walks to the bathroom. Patient reports compliance with with diuretic/medications. Attempted extra dose of torsemide without relief of symptoms. Reports + orthopnea. Denies chest discomfort, denies fevers, chills, nausea. Patient admitted for NSTEMI- noted elevated trop ST depression with q wave in inferior leads. Received IV diuresis x2 40mg IV lasix. Underwent LHC demonstrated ostial flush total occlusion of RCA with L-R collateral, otherwise luminal irregularities of remaining vessels, EDP 6.     Meds: Lisinopril 20mg, Lipitor 20, torsemide 5, amlodipine 5  Social: Denies tobacco, social ETOH  FHx: Denies CAD or CM.     PAST MEDICAL & SURGICAL HISTORY:  Dyspnea and respiratory abnormality  Dyspnea on exertion    Hypothyroidism  Hypothyroid    Essential hypertension  Hypertension    Hodgkin&#x27;s disease  Hodgkin lymphoma    Other postprocedural status  S/P lymph node biopsy    Other postprocedural status  S/P  section        MEDICATIONS  (STANDING):  amLODIPine   Tablet 5 milliGRAM(s) Oral daily  aspirin enteric coated 81 milliGRAM(s) Oral daily  atorvastatin 40 milliGRAM(s) Oral at bedtime  ferrous    sulfate 325 milliGRAM(s) Oral daily  iron sucrose IVPB 300 milliGRAM(s) IV Intermittent every 24 hours  levothyroxine 75 MICROGram(s) Oral daily  lisinopril 20 milliGRAM(s) Oral daily    MEDICATIONS  (PRN):    Vital Signs Last 24 Hrs  T(C): 37.1 (2022 13:33), Max: 37.1 (2022 13:33)  T(F): 98.7 (2022 13:33), Max: 98.7 (2022 13:33)  HR: 64 (2022 09:32) (62 - 65)  BP: 134/77 (2022 09:32) (110/58 - 146/74)  BP(mean): 84 (2022 05:36) (84 - 98)  RR: 18 (2022 09:32) (16 - 18)  SpO2: 100% (2022 05:36) (98% - 100%)    PHYSICAL EXAM:  GEN: Awake, alert. NAD.   HEENT: NCAT, PERRL, EOMI. Mucosa moist. No JVD.  RESP: CTA b/l  CV: RRR. 3/6 systolic murmur along LSB.   ABD: Soft. NT/ND.   EXT: Warm. No edema  NEURO: AAOx3. No focal deficits.     LABS:                        8.9    8.86  )-----------( 229      ( 2022 07:42 )             28.1     06-    137  |  107  |  10  ----------------------------<  98  4.3   |  21<L>  |  0.78    Ca    8.9      2022 07:42  Mg     2.0     06-06    TPro  7.2  /  Alb  3.5  /  TBili  0.3  /  DBili  x   /  AST  26  /  ALT  41  /  AlkPhos  64  06-06        PT/INR - ( 2022 07:42 )   PT: 16.0 sec;   INR: 1.34          PTT - ( 2022 07:42 )  PTT:35.9 sec    I&O's Summary    2022 07:01  -  2022 07:00  --------------------------------------------------------  IN: 730 mL / OUT: 200 mL / NET: 530 mL    2022 07:01  -  2022 13:57  --------------------------------------------------------  IN: 180 mL / OUT: 0 mL / NET: 180 mL      RADIOLOGY & ADDITIONAL STUDIES:    EKG:

## 2022-06-06 NOTE — PROGRESS NOTE ADULT - PROBLEM SELECTOR PLAN 2
CP-free, Trop peaked at 0.99   -EKG NSR with Q waves in the inferior leads (seen on prior EKG), but now with TWI inferolaterally.   - S/p diagnostic cardiac cath 6/4/22 w/ Dr Leigh: RCA flush occlusion at ostium w/ L-R collaterals. LM no sig disease, LAD/LCx w/ luminal irregularities. LVEDP 6  - , A1c 5.8%  - c/w medical management- aspirin 81mg daily, atorvastatin 40mg daily  - c/w home Lisinopril and amlodipine  - will not start BB as pt w/ baseline sinus bradycardia 50s CP-free, Trop peaked at 0.99   - EKG NSR with Q waves in the inferior leads (seen on prior EKG), but now with TWI inferolaterally.   - S/p diagnostic cardiac cath 6/4: RCA flush occlusion at ostium w/ L-R collaterals. LM no sig disease, LAD/LCx w/ luminal irregularities. LVEDP 6  - , A1c 5.8%  - c/w medical management- aspirin 81mg daily, atorvastatin 40mg daily  - c/w home Lisinopril and amlodipine  - will not start BB as pt w/ baseline sinus bradycardia 50s

## 2022-06-06 NOTE — PROGRESS NOTE ADULT - SUBJECTIVE AND OBJECTIVE BOX
CARDIOLOGY NP PROGRESS NOTE    Subjective:   Remainder ROS otherwise negative.    Overnight Events:     TELEMETRY:    EKG:      VITAL SIGNS:  T(C): 36.3 (06-06-22 @ 09:57), Max: 36.8 (06-06-22 @ 05:23)  HR: 64 (06-06-22 @ 09:32) (62 - 65)  BP: 134/77 (06-06-22 @ 09:32) (110/58 - 146/74)  RR: 18 (06-06-22 @ 09:32) (16 - 18)  SpO2: 100% (06-06-22 @ 05:36) (98% - 100%)  Wt(kg): --    I&O's Summary    05 Jun 2022 07:01  -  06 Jun 2022 07:00  --------------------------------------------------------  IN: 730 mL / OUT: 200 mL / NET: 530 mL    06 Jun 2022 07:01  -  06 Jun 2022 13:27  --------------------------------------------------------  IN: 180 mL / OUT: 0 mL / NET: 180 mL          PHYSICAL EXAM:    General: A/ox 3, No acute Distress  Neck: Supple, NO JVD  Cardiac: S1 S2, No M/R/G  Pulmonary: CTAB, Breathing unlabored, No Rhonchi/Rales/Wheezing  Abdomen: Soft, Non -tender, +BS x 4 quads  Extremities: No Rashes, No edema  Neuro: A/o x 3, No focal deficits          LABS:                          8.9    8.86  )-----------( 229      ( 06 Jun 2022 07:42 )             28.1                              06-06    137  |  107  |  10  ----------------------------<  98  4.3   |  21<L>  |  0.78    Ca    8.9      06 Jun 2022 07:42  Mg     2.0     06-06    TPro  7.2  /  Alb  3.5  /  TBili  0.3  /  DBili  x   /  AST  26  /  ALT  41  /  AlkPhos  64  06-06    LIVER FUNCTIONS - ( 06 Jun 2022 07:42 )  Alb: 3.5 g/dL / Pro: 7.2 g/dL / ALK PHOS: 64 U/L / ALT: 41 U/L / AST: 26 U/L / GGT: x                                 PT/INR - ( 06 Jun 2022 07:42 )   PT: 16.0 sec;   INR: 1.34          PTT - ( 06 Jun 2022 07:42 )  PTT:35.9 sec  CAPILLARY BLOOD GLUCOSE                Allergies:  No Known Allergies    MEDICATIONS  (STANDING):  amLODIPine   Tablet 5 milliGRAM(s) Oral daily  aspirin enteric coated 81 milliGRAM(s) Oral daily  atorvastatin 40 milliGRAM(s) Oral at bedtime  ferrous    sulfate 325 milliGRAM(s) Oral daily  iron sucrose IVPB 300 milliGRAM(s) IV Intermittent every 24 hours  levothyroxine 75 MICROGram(s) Oral daily  lisinopril 20 milliGRAM(s) Oral daily    MEDICATIONS  (PRN):        DIAGNOSTIC TESTS:        CARDIOLOGY NP PROGRESS NOTE    Subjective:  Received pt awake in bed in NAD. Still c/o BRASWELL. Denies CP, dizziness/diaphoresis, n/v, palpitations.  Remainder ROS otherwise negative.    Overnight Events:  No acute events overnight     TELEMETRY: Junctional Rhythm (HR 60s)    VITAL SIGNS:  T(C): 36.3 (06-06-22 @ 09:57), Max: 36.8 (06-06-22 @ 05:23)  HR: 64 (06-06-22 @ 09:32) (62 - 65)  BP: 134/77 (06-06-22 @ 09:32) (110/58 - 146/74)  RR: 18 (06-06-22 @ 09:32) (16 - 18)  SpO2: 100% (06-06-22 @ 05:36) (98% - 100%)  Wt(kg): --    I&O's Summary    05 Jun 2022 07:01  -  06 Jun 2022 07:00  --------------------------------------------------------  IN: 730 mL / OUT: 200 mL / NET: 530 mL    06 Jun 2022 07:01  -  06 Jun 2022 13:27  --------------------------------------------------------  IN: 180 mL / OUT: 0 mL / NET: 180 mL          PHYSICAL EXAM:    General: A/ox 3, No acute Distress  Neck: Supple, NO JVD  Cardiac: S1 S2, No M/R/G  Pulmonary: CTAB, Breathing unlabored, No Rhonchi/Rales/Wheezing  Abdomen: Soft, Non -tender, +BS x 4 quads  Extremities: No Rashes, No edema  Neuro: A/o x 3, No focal deficits          LABS:                          8.9    8.86  )-----------( 229      ( 06 Jun 2022 07:42 )             28.1                              06-06    137  |  107  |  10  ----------------------------<  98  4.3   |  21<L>  |  0.78    Ca    8.9      06 Jun 2022 07:42  Mg     2.0     06-06    TPro  7.2  /  Alb  3.5  /  TBili  0.3  /  DBili  x   /  AST  26  /  ALT  41  /  AlkPhos  64  06-06    LIVER FUNCTIONS - ( 06 Jun 2022 07:42 )  Alb: 3.5 g/dL / Pro: 7.2 g/dL / ALK PHOS: 64 U/L / ALT: 41 U/L / AST: 26 U/L / GGT: x                                 PT/INR - ( 06 Jun 2022 07:42 )   PT: 16.0 sec;   INR: 1.34          PTT - ( 06 Jun 2022 07:42 )  PTT:35.9 sec  CAPILLARY BLOOD GLUCOSE                Allergies:  No Known Allergies    MEDICATIONS  (STANDING):  amLODIPine   Tablet 5 milliGRAM(s) Oral daily  aspirin enteric coated 81 milliGRAM(s) Oral daily  atorvastatin 40 milliGRAM(s) Oral at bedtime  ferrous    sulfate 325 milliGRAM(s) Oral daily  iron sucrose IVPB 300 milliGRAM(s) IV Intermittent every 24 hours  levothyroxine 75 MICROGram(s) Oral daily  lisinopril 20 milliGRAM(s) Oral daily    MEDICATIONS  (PRN):        DIAGNOSTIC TESTS:        CARDIOLOGY NP PROGRESS NOTE    Subjective:  Received pt awake in bed in NAD. Still c/o BRASWELL. Denies CP, dizziness/diaphoresis, n/v, palpitations.  Remainder ROS otherwise negative.    Overnight Events:  No acute events overnight     TELEMETRY: Junctional Rhythm (HR 60s)    VITAL SIGNS:  T(C): 36.3 (06-06-22 @ 09:57), Max: 36.8 (06-06-22 @ 05:23)  HR: 64 (06-06-22 @ 09:32) (62 - 65)  BP: 134/77 (06-06-22 @ 09:32) (110/58 - 146/74)  RR: 18 (06-06-22 @ 09:32) (16 - 18)  SpO2: 100% (06-06-22 @ 05:36) (98% - 100%)  Wt(kg): --    I&O's Summary    05 Jun 2022 07:01  -  06 Jun 2022 07:00  --------------------------------------------------------  IN: 730 mL / OUT: 200 mL / NET: 530 mL    06 Jun 2022 07:01  -  06 Jun 2022 13:27  --------------------------------------------------------  IN: 180 mL / OUT: 0 mL / NET: 180 mL          PHYSICAL EXAM:    General: A/ox 3, No acute Distress  Neck: Supple, NO JVD  Cardiac: S1 S2, (+) murmur   Pulmonary: CTAB, Breathing unlabored, No Rhonchi/Rales/Wheezing  Abdomen: Soft, Non -tender, +BS x 4 quads  Extremities: No Rashes, No edema  Neuro: A/o x 3, No focal deficits          LABS:                          8.9    8.86  )-----------( 229      ( 06 Jun 2022 07:42 )             28.1                              06-06    137  |  107  |  10  ----------------------------<  98  4.3   |  21<L>  |  0.78    Ca    8.9      06 Jun 2022 07:42  Mg     2.0     06-06    TPro  7.2  /  Alb  3.5  /  TBili  0.3  /  DBili  x   /  AST  26  /  ALT  41  /  AlkPhos  64  06-06    LIVER FUNCTIONS - ( 06 Jun 2022 07:42 )  Alb: 3.5 g/dL / Pro: 7.2 g/dL / ALK PHOS: 64 U/L / ALT: 41 U/L / AST: 26 U/L / GGT: x                                 PT/INR - ( 06 Jun 2022 07:42 )   PT: 16.0 sec;   INR: 1.34          PTT - ( 06 Jun 2022 07:42 )  PTT:35.9 sec  CAPILLARY BLOOD GLUCOSE                Allergies:  No Known Allergies    MEDICATIONS  (STANDING):  amLODIPine   Tablet 5 milliGRAM(s) Oral daily  aspirin enteric coated 81 milliGRAM(s) Oral daily  atorvastatin 40 milliGRAM(s) Oral at bedtime  ferrous    sulfate 325 milliGRAM(s) Oral daily  iron sucrose IVPB 300 milliGRAM(s) IV Intermittent every 24 hours  levothyroxine 75 MICROGram(s) Oral daily  lisinopril 20 milliGRAM(s) Oral daily    MEDICATIONS  (PRN):        DIAGNOSTIC TESTS:

## 2022-06-06 NOTE — PROGRESS NOTE ADULT - PROBLEM SELECTOR PLAN 7
-Apical groundglass opacities and scarring noted on CT chest, stable/improved since 2015 and do not explain her symptoms per pulm (Dr. Sanders  -follow up with Dr. Sanders outpatient in 4-6 weeks to follow up on the chronically abnormal CT chest and to obtain PFTs - Apical ground glass opacities and scarring noted on CT chest, stable/improved since 2015 and do not explain her symptoms per pulm (Dr. Sanders  - follow up with Dr. Sanders outpatient in 4-6 weeks to follow up on the chronically abnormal CT chest

## 2022-06-06 NOTE — PROGRESS NOTE ADULT - ASSESSMENT
41 y/oF PMHx post-partum cardiomyopathy/chronic HF w/recovered EF (30% --> 45-50%), HTN, HLD, pre-eclampsia, non-hodgkin's lymphoma (s/p chemo/radiation ~15yrs ago with bleomycin, in remission, complicated by pulmonary toxicity), pre-diabetes, hypothyroidism presented to ED 6/3/22 w/SOB and BL LE edema, found to have NSTEMI, now s/p diagnostic cath with plan for Right heart cath with Dr. Weber today (6/6).

## 2022-06-06 NOTE — PROGRESS NOTE ADULT - PROBLEM SELECTOR PLAN 9
#pre-diabetes  - A1c 5.8%  - pt prescribed metformin but not taking due to GI side effects  - Nutrition consult  - lifestyle modifications #pre-diabetes  - A1c 5.8%  - pt prescribed metformin but not taking due to GI side effects  - Nutrition consult; lifestyle modifications    F: No IVF  N: NPO for R heart cath   E: Replete lytes PRN K<4, Mg<2  P: DVT PPX: on HSQ  C: FULL CODE  Dispo: R heart cath today

## 2022-06-06 NOTE — PROGRESS NOTE ADULT - PROBLEM SELECTOR PLAN 8
Pt previously on Synthroid 75mcg, but have not taken x 1yr.  - TSH elevated 16.7. T3/T4 WNL.  - restarted Synthroid 75mcg qd  - recommend follow up with Endocrine as outpatient - TSH elevated 16.7. T3/T4 WNL.  - c/w Synthroid 75mcg qd  - recommend follow up with Endocrine as outpatient

## 2022-06-06 NOTE — CONSULT NOTE ADULT - ATTENDING COMMENTS
This is a 42 yo F with a diagnosis of non-Hodgkins lymphoma (1988) requiring doxorubicin/bleomycin then XRT, now in remission, subsequent PPCM (2001) reportedly with an initial LVEF 30%, with an improvement to 45-50% on more recent studies admitted with NSTEMI, palpitations, worsening exertional tolerance and some LE edema. She was taken to the cath lab and found to have a totally occluded RCA with left to right collaterals without intervention at that time with an LVEDP 6 mmHg. Her TTE revealed LVEF 45%, LVIDd 5.95 cm, PRINCE, RV dilatation with decreased RVF, with mod MR, mild-mod AR, mild AS and severe TR.    Her HPI is notable for 1 week of palpitations as the main limitation of her exertional tolerance. Her admission pro-BNP was 292. Even with obesity, which can reduce the level, her clinical status did not suggest decompensated HF. She does describe orthopnea, without PND, but even with an LVEDP 6, suggesting a source other than high filling pressures as the cause. Her only diuretics were lasix IV on Fri and Sat.     Her PE is notable for appearing euvolemic on exam with a large v-wave with +HJR, but the mean RAP appears to be ~10 cm H2O. She does not have overt signs or symptoms of right-sided HF, such as poor appetite or LE edema currently. She was not taking her thyroid replacement therapy for unclear reasons and that has been resumed by the primary team.    I would proceed with the RHC today, if possible, since she appears euvolemic, but has BRASWELL and palpitations. It is important to rule-out pre-capillary PH as a cause of her RV dysfunction and severe TR, which may also be attributed to the occluded RCA (likely due to chest XRT). Encouraged her to ambulate to see if we could recapitulate her symptoms and identify an arrhythmia here. For her medical therapy, she would benefit from SGLT2-i for her cardiomyopathy and we could consider ARNI if she has hypertension (women, in particular, might benefit with HFpEF) as it is an excellent antihypertensive. She may be a candidate for SPIRRIT (randomized to spironolactone vs usual care) trial if not enrolled in an interventional trial, as well.  Further recs to follow the [facility]. Anticipate she will be appropriate for discharge in the next 1-2 days if no further work-up is needed.

## 2022-06-06 NOTE — PROGRESS NOTE ADULT - PROBLEM SELECTOR PLAN 6
-Pt reports chronic anemia since childhood, Hgb baseline appears to range from 9-11.3. No recent bleeding. Pt takes iron supplement at home  -Hgb 8s-9s this admission, MCV 78  -iron studies showing ISAT 8%, ferritin 21 consistent with iron deficiency  -start IV iron 300mg x3 days Pt reports chronic anemia since childhood, Hgb baseline appears to range from 9-11.3. No recent bleeding. Pt takes iron supplement at home  - hgb 8.86 today   - Iron Studies c/w FAZAL   - IV Iron 300mg x 3 days; c/w ferrous sulfate 325mg

## 2022-06-06 NOTE — CONSULT NOTE ADULT - TIME BILLING
Patient seen and examined with house-staff during bedside rounds.  Resident note read, including vitals, physical findings, laboratory data, and radiological reports.   Revisions included below.  Direct personal management at bed side and extensive interpretation of the data.  Plan was outlined and discussed in details with the housestaff.  Decision making of high complexity  Action taken for acute disease activity to reflect the level of care provided:  - medication reconciliation  - review laboratory data  I reviewed the CT scan and compared to the previous one.  Slight improvement in the GGO.  Dyspnea could be related to his valvular disease.  PFT as outpatient
coordination of care, patient education, chart review

## 2022-06-06 NOTE — PROGRESS NOTE ADULT - PROBLEM SELECTOR PLAN 10
#DVT PPx- SQ lovenox (holding in case of RHC Monday)    #Dispo- pending work-up as above  No anticipated SW needs  Med rec done via Surescripts + pt's verbal report    Case d/w Dr. Yuen

## 2022-06-06 NOTE — PROGRESS NOTE ADULT - PROBLEM SELECTOR PLAN 3
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 (30% --> 45-50%)  -Currently euvolemic on exam  -Chest imaging without pulm edema,   -ECHO 6/3: EF 45-50%, mildly dilated LV, dilated RV/reduced RVSF, biatrial dilation, mild-mod AI, mild AS, mod MR, severe TR, PASP 36 mmHg (may be underestimated given severity of TR)  -daily standing weights, strict I/Os, core measures, <2g Na diet  -s/p Lasix 40mg IV x2 doses on admit, though clinically not volume overloaded and no subjective improvement  -continue home lisinopril  -no beta blocker as pt sinus oscar 50s, 40s at night  -consider Advanced CHF consult Monday 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   - ECHO 6/3: EF 45-50%, mildly dilated LV, dilated RV/reduced RVSF, biatrial dilation, mild-mod AI, mild AS, mod MR, severe TR, PASP 36 mmHg (may be underestimated given severity of TR)  - s/p Lasix 40mg IV x2 doses on admit, though clinically not volume overloaded and no subjective improvement  - c/w lisinopril 20mg   - no beta blocker as pt sinus oscar 50s, 40s at night  - Heart failure following, appreciate recs.   - plan for R heart cath today. Consent obtained and in chart   - core measures. Strict I/Os, daily weights.

## 2022-06-06 NOTE — CHART NOTE - NSCHARTNOTEFT_GEN_A_CORE
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 In ED she was noted to have elevated troponin and D-Dimer and was admitted to cardiology for possible CHF exacerbation in setting of NSTEMI. CT chest PE protocol revealed no PE. TTE 6/3/22 revealed Mild-to-moderate aortic regurgitation, Mild aortic stenosis, Moderate mitral regurgitation, and severe, unrestricted tricuspid regurgitation, EF 45%. On 6/4/22 she underwent a diagnostic cardiac catheterization with Dr. Leigh showing RCA flush occlusion at ostium w/ L-R collaterals and no intervention done. Structural heart team was consulted for evaluation of severe TR and moderate MR.    Plan:  Problem 1: severe TR and moderate MR  -Discussed with Dr. Sousa and Dr. Pierre.  -Imaging reviewed.  -TTE 6/3/22: Mildly dilated left ventricular size. Mildly reduced left ventricular systolic function. Dilated right ventricular size. Reduced right ventricular systolic function. Biatrial enlargement. Mild-to-moderate aortic regurgitation. Mild aortic stenosis. Moderate mitral regurgitation. Severe, unrestricted tricuspid regurgitation. Trivial pericardial effusion.  -Received IV lasix with no improvement of her symptoms.  -Please get Heart failure consult.  -Agree with pulmonology consult.  -No need for immediate intervention at this time. Continue medical management.  -Please have patient follow up with Dr. Sousa in the office. Please have her call [phone] and schedule an appointment within 1 week of discharge.  Structural heart will sign off at this time.    Problem 2: NSTEMI  -s/p diagnostic cardiac cath 6/4/22 w/ Dr Leigh: RCA flush occlusion at ostium w/ L-R collaterals. LM no sig disease, LAD/LCx w/ luminal irregularities. LVEDP 6.   -continue ASA  -continue atorvastatin given improvement of transaminitis  -Care per primary team    Problem 3: HTN  -continue home lisinopril  -continue norvasc  -Care per primary team    Problem 4: Hypothyroidism  -has not taken levothyroxine in over a year  -TSH: 16.70, T3: WNL at 97  -restart levothyroxine 75mcg daily  -Care per primary team    I have reviewed clinical labs tests and reports, radiology tests and reports, as well as old patient medical records, and discussed with the referring physician and structural heart attending.

## 2022-06-07 ENCOUNTER — TRANSCRIPTION ENCOUNTER (OUTPATIENT)
Age: 42
End: 2022-06-07

## 2022-06-07 VITALS — TEMPERATURE: 98 F

## 2022-06-07 LAB
ANION GAP SERPL CALC-SCNC: 9 MMOL/L — SIGNIFICANT CHANGE UP (ref 5–17)
BUN SERPL-MCNC: 10 MG/DL — SIGNIFICANT CHANGE UP (ref 7–23)
CALCIUM SERPL-MCNC: 8.9 MG/DL — SIGNIFICANT CHANGE UP (ref 8.4–10.5)
CHLORIDE SERPL-SCNC: 107 MMOL/L — SIGNIFICANT CHANGE UP (ref 96–108)
CO2 SERPL-SCNC: 21 MMOL/L — LOW (ref 22–31)
CREAT SERPL-MCNC: 0.68 MG/DL — SIGNIFICANT CHANGE UP (ref 0.5–1.3)
EGFR: 111 ML/MIN/1.73M2 — SIGNIFICANT CHANGE UP
GLUCOSE SERPL-MCNC: 95 MG/DL — SIGNIFICANT CHANGE UP (ref 70–99)
HCT VFR BLD CALC: 26.9 % — LOW (ref 34.5–45)
HGB BLD-MCNC: 8.4 G/DL — LOW (ref 11.5–15.5)
MAGNESIUM SERPL-MCNC: 2 MG/DL — SIGNIFICANT CHANGE UP (ref 1.6–2.6)
MCHC RBC-ENTMCNC: 24.2 PG — LOW (ref 27–34)
MCHC RBC-ENTMCNC: 31.2 GM/DL — LOW (ref 32–36)
MCV RBC AUTO: 77.5 FL — LOW (ref 80–100)
NRBC # BLD: 0 /100 WBCS — SIGNIFICANT CHANGE UP (ref 0–0)
PLATELET # BLD AUTO: 220 K/UL — SIGNIFICANT CHANGE UP (ref 150–400)
POTASSIUM SERPL-MCNC: 4.1 MMOL/L — SIGNIFICANT CHANGE UP (ref 3.5–5.3)
POTASSIUM SERPL-SCNC: 4.1 MMOL/L — SIGNIFICANT CHANGE UP (ref 3.5–5.3)
RBC # BLD: 3.47 M/UL — LOW (ref 3.8–5.2)
RBC # FLD: 16.2 % — HIGH (ref 10.3–14.5)
SODIUM SERPL-SCNC: 137 MMOL/L — SIGNIFICANT CHANGE UP (ref 135–145)
WBC # BLD: 7.72 K/UL — SIGNIFICANT CHANGE UP (ref 3.8–10.5)
WBC # FLD AUTO: 7.72 K/UL — SIGNIFICANT CHANGE UP (ref 3.8–10.5)

## 2022-06-07 PROCEDURE — 82962 GLUCOSE BLOOD TEST: CPT

## 2022-06-07 PROCEDURE — C1894: CPT

## 2022-06-07 PROCEDURE — 99239 HOSP IP/OBS DSCHRG MGMT >30: CPT

## 2022-06-07 PROCEDURE — 86901 BLOOD TYPING SEROLOGIC RH(D): CPT

## 2022-06-07 PROCEDURE — 82728 ASSAY OF FERRITIN: CPT

## 2022-06-07 PROCEDURE — 86850 RBC ANTIBODY SCREEN: CPT

## 2022-06-07 PROCEDURE — 84466 ASSAY OF TRANSFERRIN: CPT

## 2022-06-07 PROCEDURE — 85610 PROTHROMBIN TIME: CPT

## 2022-06-07 PROCEDURE — 84100 ASSAY OF PHOSPHORUS: CPT

## 2022-06-07 PROCEDURE — 36415 COLL VENOUS BLD VENIPUNCTURE: CPT

## 2022-06-07 PROCEDURE — 71275 CT ANGIOGRAPHY CHEST: CPT

## 2022-06-07 PROCEDURE — 83550 IRON BINDING TEST: CPT

## 2022-06-07 PROCEDURE — 84439 ASSAY OF FREE THYROXINE: CPT

## 2022-06-07 PROCEDURE — 83880 ASSAY OF NATRIURETIC PEPTIDE: CPT

## 2022-06-07 PROCEDURE — C1887: CPT

## 2022-06-07 PROCEDURE — 80053 COMPREHEN METABOLIC PANEL: CPT

## 2022-06-07 PROCEDURE — 84484 ASSAY OF TROPONIN QUANT: CPT

## 2022-06-07 PROCEDURE — 82553 CREATINE MB FRACTION: CPT

## 2022-06-07 PROCEDURE — 82550 ASSAY OF CK (CPK): CPT

## 2022-06-07 PROCEDURE — 86900 BLOOD TYPING SEROLOGIC ABO: CPT

## 2022-06-07 PROCEDURE — 87635 SARS-COV-2 COVID-19 AMP PRB: CPT

## 2022-06-07 PROCEDURE — 85025 COMPLETE CBC W/AUTO DIFF WBC: CPT

## 2022-06-07 PROCEDURE — 83735 ASSAY OF MAGNESIUM: CPT

## 2022-06-07 PROCEDURE — 80048 BASIC METABOLIC PNL TOTAL CA: CPT

## 2022-06-07 PROCEDURE — 83540 ASSAY OF IRON: CPT

## 2022-06-07 PROCEDURE — 84481 FREE ASSAY (FT-3): CPT

## 2022-06-07 PROCEDURE — C8929: CPT

## 2022-06-07 PROCEDURE — 99285 EMERGENCY DEPT VISIT HI MDM: CPT | Mod: 25

## 2022-06-07 PROCEDURE — 93005 ELECTROCARDIOGRAM TRACING: CPT

## 2022-06-07 PROCEDURE — 84480 ASSAY TRIIODOTHYRONINE (T3): CPT

## 2022-06-07 PROCEDURE — 85730 THROMBOPLASTIN TIME PARTIAL: CPT

## 2022-06-07 PROCEDURE — 81025 URINE PREGNANCY TEST: CPT

## 2022-06-07 PROCEDURE — 75574 CT ANGIO HRT W/3D IMAGE: CPT

## 2022-06-07 PROCEDURE — 84702 CHORIONIC GONADOTROPIN TEST: CPT

## 2022-06-07 PROCEDURE — 80061 LIPID PANEL: CPT

## 2022-06-07 PROCEDURE — 99233 SBSQ HOSP IP/OBS HIGH 50: CPT | Mod: GC

## 2022-06-07 PROCEDURE — 83036 HEMOGLOBIN GLYCOSYLATED A1C: CPT

## 2022-06-07 PROCEDURE — 71046 X-RAY EXAM CHEST 2 VIEWS: CPT

## 2022-06-07 PROCEDURE — 93970 EXTREMITY STUDY: CPT

## 2022-06-07 PROCEDURE — 85379 FIBRIN DEGRADATION QUANT: CPT

## 2022-06-07 PROCEDURE — 84443 ASSAY THYROID STIM HORMONE: CPT

## 2022-06-07 PROCEDURE — 96374 THER/PROPH/DIAG INJ IV PUSH: CPT

## 2022-06-07 PROCEDURE — C1769: CPT

## 2022-06-07 PROCEDURE — 85027 COMPLETE CBC AUTOMATED: CPT

## 2022-06-07 RX ORDER — FERROUS SULFATE 325(65) MG
1 TABLET ORAL
Qty: 30 | Refills: 0
Start: 2022-06-07 | End: 2022-07-06

## 2022-06-07 RX ORDER — SPIRONOLACTONE 25 MG/1
1 TABLET, FILM COATED ORAL
Qty: 30 | Refills: 0
Start: 2022-06-07 | End: 2022-07-06

## 2022-06-07 RX ORDER — LEVOTHYROXINE SODIUM 125 MCG
1 TABLET ORAL
Qty: 30 | Refills: 0
Start: 2022-06-07 | End: 2022-07-06

## 2022-06-07 RX ORDER — VALSARTAN 80 MG/1
1 TABLET ORAL
Qty: 60 | Refills: 0
Start: 2022-06-07 | End: 2022-07-06

## 2022-06-07 RX ORDER — ATORVASTATIN CALCIUM 80 MG/1
1 TABLET, FILM COATED ORAL
Qty: 0 | Refills: 0 | DISCHARGE

## 2022-06-07 RX ORDER — AMLODIPINE BESYLATE 2.5 MG/1
1 TABLET ORAL
Qty: 0 | Refills: 0 | DISCHARGE

## 2022-06-07 RX ORDER — ATORVASTATIN CALCIUM 80 MG/1
1 TABLET, FILM COATED ORAL
Qty: 30 | Refills: 2
Start: 2022-06-07 | End: 2022-09-04

## 2022-06-07 RX ORDER — ASPIRIN/CALCIUM CARB/MAGNESIUM 324 MG
1 TABLET ORAL
Qty: 30 | Refills: 6
Start: 2022-06-07 | End: 2023-01-02

## 2022-06-07 RX ORDER — LISINOPRIL 2.5 MG/1
1 TABLET ORAL
Qty: 0 | Refills: 0 | DISCHARGE

## 2022-06-07 RX ADMIN — Medication 75 MICROGRAM(S): at 05:20

## 2022-06-07 RX ADMIN — Medication 325 MILLIGRAM(S): at 11:48

## 2022-06-07 RX ADMIN — Medication 81 MILLIGRAM(S): at 11:48

## 2022-06-07 RX ADMIN — IRON SUCROSE 176.67 MILLIGRAM(S): 20 INJECTION, SOLUTION INTRAVENOUS at 11:48

## 2022-06-07 RX ADMIN — LISINOPRIL 20 MILLIGRAM(S): 2.5 TABLET ORAL at 09:06

## 2022-06-07 RX ADMIN — AMLODIPINE BESYLATE 5 MILLIGRAM(S): 2.5 TABLET ORAL at 09:07

## 2022-06-07 NOTE — DISCHARGE NOTE PROVIDER - INSTRUCTIONS
- Limit salt intake on a daily basis. Salt is hidden in many foods including fast food, processed foods, deli foods and deli meats. Cook at home as much as you can and DO NOT use salt. Limit liquid intake to 1 liter a day (includes water, any drinks and soup).   - Have at least 2 cups of vegetables a day, at least 2 whole grains a day, 2 pieces of fruit a day, limiting red meat and processed meat to no more than twice per week, increasing fish intake to at least twice a week, having nuts and seeds on most days.

## 2022-06-07 NOTE — DISCHARGE NOTE PROVIDER - CARE PROVIDERS DIRECT ADDRESSES
,[email or web address]

## 2022-06-07 NOTE — PROGRESS NOTE ADULT - ASSESSMENT
ASSESSMENT:  41F PMH Post partum cardiomyopathy, NHL (ABVD- including doxorubicin, Bleomycin- with Pulm Toxicity) presenting for worsening dyspnea/palpitations with exertion and LE edema admitted initially with NSTEMI. Found to have new reduced RV and severe TR with mod MR, undergoing evaluation of valvular disease with structural team and advanced HF consulted for further optimization.    Cardiac Studies:,   6/6: RHC: RA 20mmHg, RVP 38/20, PA 35/20 (25), PCWP 15, PAP 0.75, TPG 10, CO 6.97, CI 3.4, PVR 1.4, ,  1.55, Ra/PCWP 1.33  6/6/22: Mildly reduced LV function, 45%, LVIDD 5.95, IVS 0.99, PW 0.88cm, dilated RV and reduced RV function. TAPSE 12mmS' 8cm/s,  Biatrial enlargement, mild to mod AI, mild AS, MOd MR, Severe unrestricted TR with trivial effusion.  LHC 6/4/22: oRCA flush total occlusion L-R occlusion, mild luminal irregularities of remaining vessels.

## 2022-06-07 NOTE — PROGRESS NOTE ADULT - PROBLEM SELECTOR PLAN 2
Noted new severe TR on TTE as above. Evaluated by structural team, pending further work up as outpatient.

## 2022-06-07 NOTE — DISCHARGE NOTE PROVIDER - PROVIDER TOKENS
PROVIDER:[TOKEN:[[id number]],SCHEDULEDAPPT:[06/16/2022],SCHEDULEDAPPTTIME:[02:00 PM],ESTABLISHEDPATIENT:[T]],PROVIDER:[TOKEN:[[id number]]] PROVIDER:[TOKEN:[[id number]],SCHEDULEDAPPT:[06/16/2022],SCHEDULEDAPPTTIME:[02:00 PM],ESTABLISHEDPATIENT:[T]],PROVIDER:[TOKEN:[[id number]]],PROVIDER:[TOKEN:[[id number]],FOLLOWUP:[1 week]] PROVIDER:[TOKEN:[[id number]],SCHEDULEDAPPT:[06/16/2022],SCHEDULEDAPPTTIME:[02:00 PM],ESTABLISHEDPATIENT:[T]],PROVIDER:[TOKEN:[[id number]],FOLLOWUP:[Routine]],PROVIDER:[TOKEN:[[id number]],FOLLOWUP:[1 week]],PROVIDER:[TOKEN:[[id number]],FOLLOWUP:[1 month]]

## 2022-06-07 NOTE — DISCHARGE NOTE NURSING/CASE MANAGEMENT/SOCIAL WORK - NSDCPEFALRISK_GEN_ALL_CORE
For information on Fall & Injury Prevention, visit: [email or web address] OR  [email or web address] OR  https://www.cdc.gov/steadi/patient.html

## 2022-06-07 NOTE — DISCHARGE NOTE NURSING/CASE MANAGEMENT/SOCIAL WORK - PATIENT PORTAL LINK FT
You can access the FollowMyHealth Patient Portal offered by [facility] by registering at the following website: [email or web address]. By joining [employer]’s FollowMyHealth portal, you will also be able to view your health information using other applications (apps) compatible with our system.

## 2022-06-07 NOTE — DISCHARGE NOTE PROVIDER - NSDCFUSCHEDAPPT_GEN_ALL_CORE_FT
Pascual Malhotra  [facility] Physician [facility]  HEARTVASC [address]  Scheduled Appointment: 06/16/2022

## 2022-06-07 NOTE — DISCHARGE NOTE PROVIDER - NPI NUMBER (FOR SYSADMIN USE ONLY) :
[[id number] [[id number]],[[id number]] [[id number]],[[id number]],[[id number]]

## 2022-06-07 NOTE — PROGRESS NOTE ADULT - ATTENDING COMMENTS
This is a 40 yo F with a diagnosis of non-Hodgkins lymphoma () requiring doxorubicin/bleomycin then XRT, now in remission, subsequent PPCM () reportedly with an initial LVEF 30%, with an improvement to 45-50% on more recent studies admitted with NSTEMI, palpitations, worsening exertional tolerance and some LE edema. She was taken to the cath lab and found to have a totally occluded RCA with left to right collaterals without intervention at that time with an LVEDP 6 mmHg. Her TTE revealed LVEF 45%, LVIDd 5.95 cm, PRINCE, RV dilatation with decreased RVF, with mod MR, mild-mod AR, mild AS and severe TR.    She had RHC done yesterday: RA 20, RV 38/20, PA 35/20/25, PCW 15, Bill 0.75, CO/CI (F) 6.97/3.4, PVR 1.4 Byers,  dsc,  1.55, RA/PCW ratio 1.33. Hemodynamics were consistent with RV infarct.    I suspect her RCA disease and calcified AV are related to her history of chest radiation in  for her lymphoma. I have recommended the followin. Stop lisinopril and amlodipine.  2. Start valsartan 40 mg po BID tomorrow morning with plan to eventually switch to ARNI.  3. Initiate torsemide 20 mg po daily and spironolactone 25 mg po daily.  4. Eventual start of SGLT2-i.    EP has placed a Holter monitor given the report of palpitations and observed bradycardia on overnight tele.  A follow-up appointment will be scheduled with me post-discharge.

## 2022-06-07 NOTE — DISCHARGE NOTE PROVIDER - HOSPITAL COURSE
*INCOMPLETE*        41 y/oF PMHx post-partum cardiomyopathy (diagnosed in 2001, last echo with EF 45%), pre-eclampsia, NHL s/p chemo treatment with bleomycin now in remission complicated by pulmonary toxicity, hypertension, pre-diabetes, mild AS, hypothyroidism presented to ED 6/3/22 w/worsening BRASWELL and BL LE edema. In ED, VSS. CXR w/mild pulmonary congestion. EKG: NSR, Q waves in inferior leads, TWI in V3-V6, biphasic in V1 and V2, no ST elevations. Labs s/f trop 0.94 --> 0.99. She was given IV Lasix 40mg x 1 and ASA 324mg x 1 and admitted to [facility] for further mgmt NSTEMI.     During hospitalization, CTA chest negative for PE. No pulmonary edema. Apical ground glass opacities and scarring, stable/chronic per pulm and would not explain new BRASWELL. D-dimer 720, LE dopplers negative for DVT  Trop peaked 0.99. She underwent cardiac cath 6/4: RCA flush occlusion at ostium w/ L-R collaterals. LM no sig disease, LAD/LCx w/ luminal irregularities. LVEDP 6. ECHO performed s.f EF 45-50%, mildly dilated LV, dilated RV/reduced RVSF, biatrial dilation, mild-mod AI, mild AS, mod MR, severe TR, PASP 36 mmHg (may be underestimated given severity of TR). As per structural team, no intervention at this time in terms of her TR. She underwent a Right heart cath 6/6:  RAP: 20 mmHg, RVP: 38/20 mmHg, PA: 35/20 mmHg, mPAP: 25 mmHg, PCWP: 15 mmHg, TAMARA: 0.75, TPG: 10, Cardiac Output (SONIA): 6.97, Cardiac Index: 3.4  PVR: 1.4, , : 1.55, Ra/PCWP: 1.33. R IJ access. Elevated right-sided pressures consistent with RV dysfunction         An echocardiogram or ultrasound of your heart was performed which showed an ejection fraction or pumping function of your heart to be 55-60% with diastolic dysfunction. In other words, you were diagnosed with diastolic heart failure. This is a type of heart failure. Heart failure is a condition in which the heart does not pump or fill with blood well. This causes the heart to lag behind in its job of moving blood throughout the body. This can lead to symptoms such as swelling, trouble breathing, and feeling tired. You were given intravenous Lasix to get rid of the fluid in your lungs and to help you breathe better. Please take Lasix  as prescribed without missing doses. Please maintain a low salt diet (less than 2grams per day). Weigh yourself daily and report any weight gain over 2 pounds/day to your Doctor.   41 y/oF PMHx post-partum cardiomyopathy (diagnosed in 2001, last echo with EF 45%), pre-eclampsia, NHL s/p chemo treatment with bleomycin now in remission complicated by pulmonary toxicity, hypertension, pre-diabetes, mild AS, hypothyroidism presented to ED 6/3/22 w/worsening BRASWELL and BL LE edema. In ED, VSS. CXR w/mild pulmonary congestion. EKG: NSR, Q waves in inferior leads, TWI in V3-V6, biphasic in V1 and V2, no ST elevations. Labs s/f trop 0.94 --> 0.99. She was given IV Lasix 40mg x 1 and ASA 324mg x 1 and admitted to [facility] for further mgmt NSTEMI.     During hospitalization, CTA chest negative for PE. No pulmonary edema. Apical ground glass opacities and scarring, stable/chronic per pulm and would not explain new BRASWELL. D-dimer 720, LE dopplers negative for DVT  Trop peaked 0.99. She underwent cardiac cath 6/4: RCA flush occlusion at ostium w/ L-R collaterals. LM no sig disease, LAD/LCx w/ luminal irregularities. LVEDP 6. ECHO performed s.f EF 45-50%, mildly dilated LV, dilated RV/reduced RVSF, biatrial dilation, mild-mod AI, mild AS, mod MR, severe TR, PASP 36 mmHg (may be underestimated given severity of TR). As per structural team, no intervention at this time in terms of her TR. She underwent a Right heart cath 6/6:  RAP: 20 mmHg, RVP: 38/20 mmHg, PA: 35/20 mmHg, mPAP: 25 mmHg, PCWP: 15 mmHg, TAMARA: 0.75, TPG: 10, Cardiac Output (SONIA): 6.97, Cardiac Index: 3.4  PVR: 1.4, , : 1.55, Ra/PCWP: 1.33. R IJ access. Elevated right-sided pressures consistent with RV dysfunction     On the day of discharge, the patient was seen and examined. Symptoms improved. Vital signs are stable. Labs and imaging reviewed. Patient is medically optimized and hemodynamically stable. Return precautions discussed, medication teach back done w/ patient, and importance of physician followup emphasized for which she verbalized understanding. Holter monitor will be sent to patient house (setting up by EP)       DC Meds:  ASA 81mg  Atorvastatin 40mg  Spironolactone 25mg  Valsartan 40mg BID   Torsemide 20mg

## 2022-06-07 NOTE — DISCHARGE NOTE PROVIDER - NSDCMRMEDTOKEN_GEN_ALL_CORE_FT
amLODIPine 5 mg oral tablet: 1 tab(s) orally once a day  atorvastatin 20 mg oral tablet: 1 tab(s) orally once a day  Entresto 24 mg-26 mg oral tablet: 1 tab(s) orally 2 times a day   Farxiga 10 mg oral tablet: 1 tab(s) orally once a day   levothyroxine 75 mcg (0.075 mg) oral tablet: 1 tab(s) orally once a day  lisinopril 20 mg oral tablet: 1 tab(s) orally once a day  torsemide 5 mg oral tablet: 1 tab(s) orally once a day   aspirin 81 mg oral delayed release tablet: 1 tab(s) orally once a day  ferrous sulfate 325 mg (65 mg elemental iron) oral tablet: 1 tab(s) orally once a day  levothyroxine 75 mcg (0.075 mg) oral tablet: 1 tab(s) orally once a day  Lipitor 40 mg oral tablet: 1 tab(s) orally once a day  spironolactone 25 mg oral tablet: 1 tab(s) orally once a day   torsemide 20 mg oral tablet: 1 tab(s) orally once a day   valsartan 40 mg oral tablet: 1 tab(s) orally 2 times a day. START 6/8/22

## 2022-06-07 NOTE — DISCHARGE NOTE PROVIDER - CARE PROVIDER_API CALL
Pascual Malhotra  CARDIOLOGY  [address] [address]  Phone: [phone]  Fax: [phone]  Established Patient  Scheduled Appointment: 06/16/2022 02:00 PM    Cedrick Lucio; PhD)  Adv Heart Fail Trnsplnt Cardio; Cardiovascular Disease; Internal Medicine  [address] [place] [address]  Phone: [phone]  Fax: [phone]  Follow Up Time:    Pascual Malhotra  CARDIOLOGY  [address] [address]  Phone: [phone]  Fax: [phone]  Established Patient  Scheduled Appointment: 06/16/2022 02:00 PM    Cedrick Lucio; PhD)  Adv Heart Fail Trnsplnt Cardio; Cardiovascular Disease; Internal Medicine  [address] [place] [address]  Phone: [phone]  Fax: [phone]  Follow Up Time:     Lonny Sousa)  Cardiology; Interventional Cardiology  [address] [place] [address]  Phone: [phone]  Fax: [phone]  Follow Up Time: 1 week   Pascual Malhotra  CARDIOLOGY  [address] [address]  Phone: [phone]  Fax: [phone]  Established Patient  Scheduled Appointment: 06/16/2022 02:00 PM    Cedrick Lucio; PhD)  Adv Heart Fail Trnsplnt Cardio; Cardiovascular Disease; Internal Medicine  [address] [place] [address]  Phone: [phone]  Fax: [phone]  Follow Up Time: Routine    Lonny Sousa)  Cardiology; Interventional Cardiology  [address] [place] [address]  Phone: [phone]  Fax: [phone]  Follow Up Time: 1 week    Talisha Sanders)  Critical Care Medicine; Pulmonary Disease  [address] [place] [address]  Phone: [phone]  Fax: [phone]  Follow Up Time: 1 month

## 2022-06-07 NOTE — PROGRESS NOTE ADULT - SUBJECTIVE AND OBJECTIVE BOX
Heart Failure Consult Note    INTERVAL EVENTS: No chest pain, no shortness of breath.   TELEMETRY: sinus rhythm, sinus oscar with competing junctional rhythm    PAST MEDICAL & SURGICAL HISTORY:  Dyspnea and respiratory abnormality  Dyspnea on exertion    Hypothyroidism  Hypothyroid    Essential hypertension  Hypertension    Hodgkin&#x27;s disease  Hodgkin lymphoma    Other postprocedural status  S/P lymph node biopsy    Other postprocedural status  S/P  section        MEDICATIONS  (STANDING):  amLODIPine   Tablet 5 milliGRAM(s) Oral daily  aspirin enteric coated 81 milliGRAM(s) Oral daily  atorvastatin 40 milliGRAM(s) Oral at bedtime  ferrous    sulfate 325 milliGRAM(s) Oral daily  iron sucrose IVPB 300 milliGRAM(s) IV Intermittent every 24 hours  levothyroxine 75 MICROGram(s) Oral daily  lisinopril 20 milliGRAM(s) Oral daily    MEDICATIONS  (PRN):    Vital Signs Last 24 Hrs  T(C): 36.3 (2022 06:29), Max: 37.1 (2022 13:33)  T(F): 97.3 (2022 06:29), Max: 98.7 (2022 13:33)  HR: 62 (2022 09:00) (46 - 72)  BP: 132/72 (2022 09:00) (103/66 - 149/86)  BP(mean): 78 (2022 05:20) (73 - 107)  RR: 18 (2022 09:00) (16 - 18)  SpO2: 100% (2022 09:00) (99% - 100%)    PHYSICAL EXAM:  GEN: Awake, alert. NAD.   HEENT: JVP 14-15  RESP: CTA b/l  CV: RRR. Normal S1/S2  ABD: Soft. NT/ND  EXT: Warm. No edema  NEURO: AAOx3. No focal deficits.     LABS:                        8.4    7.72  )-----------( 220      ( 2022 05:30 )             26.9     06-07    137  |  107  |  10  ----------------------------<  95  4.1   |  21<L>  |  0.68    Ca    8.9      2022 05:30  Mg     2.0     06-07    TPro  7.2  /  Alb  3.5  /  TBili  0.3  /  DBili  x   /  AST  26  /  ALT  41  /  AlkPhos  64  06-        PT/INR - ( 2022 07:42 )   PT: 16.0 sec;   INR: 1.34          PTT - ( 2022 07:42 )  PTT:35.9 sec    I&O's Summary    2022 07:01  -  2022 07:00  --------------------------------------------------------  IN: 180 mL / OUT: 0 mL / NET: 180 mL      RADIOLOGY & ADDITIONAL STUDIES:    EKG:         Heart Failure Consult Note    INTERVAL EVENTS: No chest pain, no shortness of breath.   TELEMETRY: sinus rhythm, sinus oscar with competing junctional rhythm    PAST MEDICAL & SURGICAL HISTORY:  Dyspnea and respiratory abnormality  Dyspnea on exertion    Hypothyroidism  Hypothyroid    Essential hypertension  Hypertension      Other postprocedural status  S/P lymph node biopsy    Other postprocedural status  S/P  section        MEDICATIONS  (STANDING):  amLODIPine   Tablet 5 milliGRAM(s) Oral daily  aspirin enteric coated 81 milliGRAM(s) Oral daily  atorvastatin 40 milliGRAM(s) Oral at bedtime  ferrous    sulfate 325 milliGRAM(s) Oral daily  iron sucrose IVPB 300 milliGRAM(s) IV Intermittent every 24 hours  levothyroxine 75 MICROGram(s) Oral daily  lisinopril 20 milliGRAM(s) Oral daily    MEDICATIONS  (PRN):    Vital Signs Last 24 Hrs  T(C): 36.3 (2022 06:29), Max: 37.1 (2022 13:33)  T(F): 97.3 (2022 06:29), Max: 98.7 (2022 13:33)  HR: 62 (2022 09:00) (46 - 72)  BP: 132/72 (2022 09:00) (103/66 - 149/86)  BP(mean): 78 (2022 05:20) (73 - 107)  RR: 18 (2022 09:00) (16 - 18)  SpO2: 100% (2022 09:00) (99% - 100%)    PHYSICAL EXAM:  GEN: Awake, alert. NAD.   HEENT: JVP 14-15  RESP: CTA b/l  CV: RRR. Normal S1/S2  ABD: Soft. NT/ND  EXT: Warm. No edema  NEURO: AAOx3. No focal deficits.     LABS:                        8.4    7.72  )-----------( 220      ( 2022 05:30 )             26.9     06-07    137  |  107  |  10  ----------------------------<  95  4.1   |  21<L>  |  0.68    Ca    8.9      2022 05:30  Mg     2.0     06-07    TPro  7.2  /  Alb  3.5  /  TBili  0.3  /  DBili  x   /  AST  26  /  ALT  41  /  AlkPhos  64  06-06        PT/INR - ( 2022 07:42 )   PT: 16.0 sec;   INR: 1.34          PTT - ( 2022 07:42 )  PTT:35.9 sec    I&O's Summary    2022 07:01  -  2022 07:00  --------------------------------------------------------  IN: 180 mL / OUT: 0 mL / NET: 180 mL      RADIOLOGY & ADDITIONAL STUDIES:    EKG:         Heart Failure Consult Note    INTERVAL EVENTS: No chest pain, no shortness of breath.   TELEMETRY: sinus rhythm, sinus oscar with competing junctional rhythm    PAST MEDICAL & SURGICAL HISTORY:  Dyspnea and respiratory abnormality  Dyspnea on exertion    Hypothyroidism  Hypothyroid    Essential hypertension  Hypertension      Other postprocedural status  S/P lymph node biopsy    Other postprocedural status  S/P  section        MEDICATIONS  (STANDING):  amLODIPine   Tablet 5 milliGRAM(s) Oral daily  aspirin enteric coated 81 milliGRAM(s) Oral daily  atorvastatin 40 milliGRAM(s) Oral at bedtime  ferrous    sulfate 325 milliGRAM(s) Oral daily  iron sucrose IVPB 300 milliGRAM(s) IV Intermittent every 24 hours  levothyroxine 75 MICROGram(s) Oral daily  lisinopril 20 milliGRAM(s) Oral daily    MEDICATIONS  (PRN):    Vital Signs Last 24 Hrs  T(C): 36.3 (2022 06:29), Max: 37.1 (2022 13:33)  T(F): 97.3 (2022 06:29), Max: 98.7 (2022 13:33)  HR: 62 (2022 09:00) (46 - 72)  BP: 132/72 (2022 09:00) (103/66 - 149/86)  BP(mean): 78 (2022 05:20) (73 - 107)  RR: 18 (2022 09:00) (16 - 18)  SpO2: 100% (2022 09:00) (99% - 100%)    PHYSICAL EXAM:  GEN: Awake, alert. NAD.   HEENT: JVP 14-15  RESP: CTA b/l  CV: RRR. 3/6 systolic murmur along R/LSB.  ABD: Soft. NT/ND  EXT: Warm. No edema  NEURO: AAOx3. No focal deficits.     LABS:                        8.4    7.72  )-----------( 220      ( 2022 05:30 )             26.9     06-07    137  |  107  |  10  ----------------------------<  95  4.1   |  21<L>  |  0.68    Ca    8.9      2022 05:30  Mg     2.0     06-07    TPro  7.2  /  Alb  3.5  /  TBili  0.3  /  DBili  x   /  AST  26  /  ALT  41  /  AlkPhos  64  06-        PT/INR - ( 2022 07:42 )   PT: 16.0 sec;   INR: 1.34          PTT - ( 2022 07:42 )  PTT:35.9 sec    I&O's Summary    2022 07:01  -  2022 07:00  --------------------------------------------------------  IN: 180 mL / OUT: 0 mL / NET: 180 mL      RADIOLOGY & ADDITIONAL STUDIES:    EKG:

## 2022-06-07 NOTE — PROGRESS NOTE ADULT - PROBLEM SELECTOR PROBLEM 3
REASON FOR VISIT:  Facial injury, at least 5 weeks ago    Self referral      INTERVAL EVENTS:  46-year-old woman fell forward onto her face while walking dog 5 the patient does not remember anything.  On the date of injury she is treated at the [facility].  Three sutures were placed into laceration just above the right eyebrow.  CT scan was performed, unknown type, patient states results were normal.    About a week later she had sutures removed.    Main concerns at this point include numbness of the right forehead, swelling and tenderness of skin of the cheek, swelling of the eyebrow    Patient denies headache, vision changes    EXAM:  Patient has some pinkness chromium in a 2 cm x 3 cm area of just above right eyebrow abrasion.  Patient has a linear scar within the right eyebrow a slightly pink.  However for the most part the wound healed very well.  She has a little bit of stiffness mild swelling skin of eyebrow area.  Brow elevation is little bit weak on the right which seems be just due to swelling and stiffness of the soft tissue.  The right cheek skin appears normal but upon palpation has a little bit of thickness consistent with contusion.  There is no actual color distortion however.  There is no bony step-offs.  The orbital rims, malar cheek have a normal projection and appearance without tenderness.    ASSESSMENT:  Abrasion, laceration, and contusion of the right forehead and right cheek.  Healing appropriately    PLAN:  Patient was counseled that soft tissue swelling and stiffness is going to take 3-6 months to resolve.  I advised the patient to keep the healing scar out of sun to minimize long-term discoloration.  She can follow-up needed.      
Chronic diastolic congestive heart failure
Hypertension
Cardiomyopathy
Chronic diastolic congestive heart failure

## 2022-06-07 NOTE — PROGRESS NOTE ADULT - PROBLEM SELECTOR PLAN 1
RHC: RA 20mmHg, RVP 38/20, PA 35/20 (25), PCWP 15, PAP 0.75, TPG 10, CO 6.97, CI 3.4, PVR 1.4, ,  1.55, Ra/PCWP 1.33  Consistent with RV dysfunction, setting of oRCA total occlusion  - Start Torsemide 20mg  - Switch Lisinopril to valsartan 40mg BID starting tomorrow  - Start spironolactone 25mg  - Consider EP/further evaluation of sinus node dysfunction noted ton telemetry RHC: RA 20mmHg, RVP 38/20, PA 35/20 (25), PCWP 15, PAP 0.75, TPG 10, CO 6.97, CI 3.4, PVR 1.4, ,  1.55, Ra/PCWP 1.33  Consistent with RV dysfunction, setting of oRCA total occlusion  - Start Torsemide 20mg  - Switch Lisinopril to valsartan 40mg BID starting tomorrow  - Start spironolactone 25mg  - Consider EP/further evaluation of sinus node dysfunction noted on telemetry

## 2022-06-07 NOTE — DISCHARGE NOTE PROVIDER - NSDCCPCAREPLAN_GEN_ALL_CORE_FT
PRINCIPAL DISCHARGE DIAGNOSIS  Diagnosis: Non-ST elevation MI (NSTEMI)  Assessment and Plan of Treatment:       SECONDARY DISCHARGE DIAGNOSES  Diagnosis: Severe tricuspid regurgitation  Assessment and Plan of Treatment: - While you were in the hospital you had an echocardiogram or ultrasound of your heart performed which showed that you have tricuspid regurgitation. Tricuspid regurgitation is a condition in which one of the valves of the heart, called the tricuspid valve, leaks.   - When the heart valves work normally, they keep blood flowing in only 1 direction. The valves work like swinging doors that open only 1 way – letting blood out, but not back in.  Normally, little or no blood is able to leak backward. But if the valves are not working properly, more blood can go back in the direction it came from.  - You were seen by the structural team while you were here for which no intervention was deemed necessary during your hospitalization. Please call Dr. Sousa's office to schedule an appointment within one week of discharge from the hospital.    Diagnosis: Chronic diastolic congestive heart failure  Assessment and Plan of Treatment:      PRINCIPAL DISCHARGE DIAGNOSIS  Diagnosis: Non-ST elevation MI (NSTEMI)  Assessment and Plan of Treatment: - The heart requires a supply of blood. The blood supply to the heart is provided by blood vessels called the coronary arteries. Myocardial infarction, or MI (commonly known as a "heart attack"), is damage or death of part of the heart muscle. The damage is caused by lack of blood flow through the coronary arteries. You presented to the hospital with chest pain and tests were performed showing that you HAD A HEART ATTACK. An angiogram was performed, showing that you have coronary artery disease in which the arteries become narrowed by fatty deposits called plaque, which limit the blood flow to the heart, causing the chest pain.  - No intervention/stent was placed and you are to START Aspirin 81mg and INCREASE Atorvastatin 20mg to  40mg.      SECONDARY DISCHARGE DIAGNOSES  Diagnosis: Chronic diastolic congestive heart failure  Assessment and Plan of Treatment: - An echocardiogram or ultrasound of your heart was performed which showed an ejection fraction or pumping function of your heart to be 45-50% with diastolic dysfunction, which you have a known history of. Heart failure is a condition in which the heart does not pump or fill with blood well. This causes the heart to lag behind in its job of moving blood throughout the body. This can lead to symptoms such as swelling, trouble breathing, and feeling tired.   - You are to INCREASE Torsemide 5mg to 20mg daily and START Spironolactone 25mg daily. You are to also STOP Lisinopril 20mg and START Valsartan 40mg twice a day on 6/8/22.   - Please maintain a low salt diet (less than 2grams per day). Weigh yourself daily and report any weight gain over 2 pounds/day to your Doctor.      Diagnosis: Hypertension  Assessment and Plan of Treatment: - You have a known history of hypertension, which is commonly called high blood pressure, is when the force of blood pumping through your arteries is too strong. Hypertension forces your heart to work harder to pump blood. Your arteries may become narrow or stiff. Having untreated or uncontrolled hypertension for a long period of time can cause heart attack, stroke, kidney disease, and other problems.   - You are to STOP Amlodipine AND Lisinopril. You are to START Valsartan 40mg twice a day on 6/8/22. Be sure to follow a low salt diet. If you have been prescribed antihypertensive medications to control your blood pressure, be sure to take them every day as prescribed and do not miss any doses, the medications do not work if they are not taken consistently   - In addition, there are multiple lifestyle modifications that have been proven to lower blood pressure: maintaining a healthy body weight, engaging in regular physical activity for at least 30 minutes per day on most days, and consuming a diet rich in fruits, vegetables, and low-fat dairy products with a reduced amount of total and saturated fats and sodium.    Diagnosis: Severe tricuspid regurgitation  Assessment and Plan of Treatment: - While you were in the hospital you had an echocardiogram or ultrasound of your heart performed which showed that you have tricuspid regurgitation. Tricuspid regurgitation is a condition in which one of the valves of the heart, called the tricuspid valve, leaks.   - When the heart valves work normally, they keep blood flowing in only 1 direction. The valves work like swinging doors that open only 1 way – letting blood out, but not back in.  Normally, little or no blood is able to leak backward. But if the valves are not working properly, more blood can go back in the direction it came from.  - You were seen by the structural team while you were here for which no intervention was deemed necessary during your hospitalization. Please call Dr. Sousa's office to schedule an appointment within one week of discharge from the hospital.    Diagnosis: Ground glass opacity present on imaging of lung  Assessment and Plan of Treatment: - You have a known historty of opacites seen on the CT scan since 2015. Your CT scan remains stable and you are to call and schedule a follow up apt with Dr. Sanders within 1 month of discharge from the hospital.

## 2022-06-07 NOTE — PROGRESS NOTE ADULT - PROBLEM SELECTOR PLAN 3
Etiology: Post-Partum cardiomyopathy diagnosed initially in 2001, Worsened after pregnancy in 2014. Of note hx of doxorubicin use from NHL.   Diuretic: Start torsemide 20mg  GDMT: Start spironolactone 25mg, Switch Lisinopril to valsartan 40mg BID  Device: Not currently, further evaluation of arrhythmia per primary team.

## 2022-06-07 NOTE — PROGRESS NOTE ADULT - PROBLEM SELECTOR PROBLEM 2
Severe tricuspid regurgitation
NSTEMI (non-ST elevation myocardial infarction)

## 2022-06-15 DIAGNOSIS — I35.2 NONRHEUMATIC AORTIC (VALVE) STENOSIS WITH INSUFFICIENCY: ICD-10-CM

## 2022-06-15 DIAGNOSIS — E66.9 OBESITY, UNSPECIFIED: ICD-10-CM

## 2022-06-15 DIAGNOSIS — D50.9 IRON DEFICIENCY ANEMIA, UNSPECIFIED: ICD-10-CM

## 2022-06-15 DIAGNOSIS — R00.1 BRADYCARDIA, UNSPECIFIED: ICD-10-CM

## 2022-06-15 DIAGNOSIS — I25.10 ATHEROSCLEROTIC HEART DISEASE OF NATIVE CORONARY ARTERY WITHOUT ANGINA PECTORIS: ICD-10-CM

## 2022-06-15 DIAGNOSIS — I36.1 NONRHEUMATIC TRICUSPID (VALVE) INSUFFICIENCY: ICD-10-CM

## 2022-06-15 DIAGNOSIS — Z92.3 PERSONAL HISTORY OF IRRADIATION: ICD-10-CM

## 2022-06-15 DIAGNOSIS — I11.0 HYPERTENSIVE HEART DISEASE WITH HEART FAILURE: ICD-10-CM

## 2022-06-15 DIAGNOSIS — E03.9 HYPOTHYROIDISM, UNSPECIFIED: ICD-10-CM

## 2022-06-15 DIAGNOSIS — I42.8 OTHER CARDIOMYOPATHIES: ICD-10-CM

## 2022-06-15 DIAGNOSIS — Z92.21 PERSONAL HISTORY OF ANTINEOPLASTIC CHEMOTHERAPY: ICD-10-CM

## 2022-06-15 DIAGNOSIS — C85.90 NON-HODGKIN LYMPHOMA, UNSPECIFIED, UNSPECIFIED SITE: ICD-10-CM

## 2022-06-15 DIAGNOSIS — I21.4 NON-ST ELEVATION (NSTEMI) MYOCARDIAL INFARCTION: ICD-10-CM

## 2022-06-15 DIAGNOSIS — J98.4 OTHER DISORDERS OF LUNG: ICD-10-CM

## 2022-06-15 DIAGNOSIS — I34.0 NONRHEUMATIC MITRAL (VALVE) INSUFFICIENCY: ICD-10-CM

## 2022-06-15 DIAGNOSIS — I50.32 CHRONIC DIASTOLIC (CONGESTIVE) HEART FAILURE: ICD-10-CM

## 2022-06-16 ENCOUNTER — APPOINTMENT (OUTPATIENT)
Dept: HEART AND VASCULAR | Facility: CLINIC | Age: 42
End: 2022-06-16
Payer: MEDICAID

## 2022-06-16 ENCOUNTER — APPOINTMENT (OUTPATIENT)
Dept: HEART AND VASCULAR | Facility: CLINIC | Age: 42
End: 2022-06-16

## 2022-06-16 ENCOUNTER — NON-APPOINTMENT (OUTPATIENT)
Age: 42
End: 2022-06-16

## 2022-06-16 VITALS
HEART RATE: 92 BPM | HEIGHT: 65 IN | WEIGHT: 196.98 LBS | SYSTOLIC BLOOD PRESSURE: 118 MMHG | BODY MASS INDEX: 32.82 KG/M2 | TEMPERATURE: 96.8 F | OXYGEN SATURATION: 97 % | DIASTOLIC BLOOD PRESSURE: 72 MMHG

## 2022-06-16 DIAGNOSIS — D64.9 ANEMIA, UNSPECIFIED: ICD-10-CM

## 2022-06-16 PROCEDURE — 93000 ELECTROCARDIOGRAM COMPLETE: CPT

## 2022-06-16 PROCEDURE — 36415 COLL VENOUS BLD VENIPUNCTURE: CPT

## 2022-06-16 PROCEDURE — 99214 OFFICE O/P EST MOD 30 MIN: CPT | Mod: 25

## 2022-06-16 RX ORDER — CHLORHEXIDINE GLUCONATE 4 %
325 (65 FE) LIQUID (ML) TOPICAL
Qty: 30 | Refills: 0 | Status: ACTIVE | COMMUNITY
Start: 2022-06-07

## 2022-06-16 RX ORDER — ASPIRIN 81 MG/1
81 TABLET, COATED ORAL
Qty: 30 | Refills: 0 | Status: ACTIVE | COMMUNITY
Start: 2022-06-07

## 2022-06-17 LAB
ALBUMIN SERPL ELPH-MCNC: 4.9 G/DL
ALP BLD-CCNC: 80 U/L
ALT SERPL-CCNC: 21 U/L
ANION GAP SERPL CALC-SCNC: 15 MMOL/L
AST SERPL-CCNC: 19 U/L
BASOPHILS # BLD AUTO: 0.06 K/UL
BASOPHILS NFR BLD AUTO: 0.7 %
BILIRUB SERPL-MCNC: 0.3 MG/DL
BUN SERPL-MCNC: 19 MG/DL
CALCIUM SERPL-MCNC: 10 MG/DL
CHLORIDE SERPL-SCNC: 98 MMOL/L
CO2 SERPL-SCNC: 26 MMOL/L
CREAT SERPL-MCNC: 0.72 MG/DL
CREAT SPEC-SCNC: 60 MG/DL
EGFR: 107 ML/MIN/1.73M2
EOSINOPHIL # BLD AUTO: 0.43 K/UL
EOSINOPHIL NFR BLD AUTO: 5 %
FERRITIN SERPL-MCNC: 365 NG/ML
FOLATE SERPL-MCNC: 3.9 NG/ML
GLUCOSE SERPL-MCNC: 96 MG/DL
HCT VFR BLD CALC: 37.5 %
HGB BLD-MCNC: 11.5 G/DL
IMM GRANULOCYTES NFR BLD AUTO: 0.1 %
LYMPHOCYTES # BLD AUTO: 2.53 K/UL
LYMPHOCYTES NFR BLD AUTO: 29.6 %
MAN DIFF?: NORMAL
MCHC RBC-ENTMCNC: 24.4 PG
MCHC RBC-ENTMCNC: 30.7 GM/DL
MCV RBC AUTO: 79.4 FL
MICROALBUMIN 24H UR DL<=1MG/L-MCNC: <1.2 MG/DL
MICROALBUMIN/CREAT 24H UR-RTO: NORMAL MG/G
MONOCYTES # BLD AUTO: 0.57 K/UL
MONOCYTES NFR BLD AUTO: 6.7 %
NEUTROPHILS # BLD AUTO: 4.96 K/UL
NEUTROPHILS NFR BLD AUTO: 57.9 %
NT-PROBNP SERPL-MCNC: 187 PG/ML
PLATELET # BLD AUTO: 287 K/UL
POTASSIUM SERPL-SCNC: 3.8 MMOL/L
PROT SERPL-MCNC: 8.9 G/DL
RBC # BLD: 4.72 M/UL
RBC # FLD: 17 %
SODIUM SERPL-SCNC: 140 MMOL/L
T4 FREE SERPL-MCNC: 1.4 NG/DL
TSH SERPL-ACNC: 4.45 UIU/ML
VIT B12 SERPL-MCNC: 398 PG/ML
WBC # FLD AUTO: 8.56 K/UL

## 2022-06-20 NOTE — ASSESSMENT
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

## 2022-06-20 NOTE — PHYSICAL EXAM
[Well Developed] : well developed [Well Nourished] : well nourished [No Acute Distress] : no acute distress [Normal Venous Pressure] : normal venous pressure [No Carotid Bruit] : no carotid bruit [Normal S1, S2] : normal S1, S2 [No Murmur] : no murmur [No Rub] : no rub [No Gallop] : no gallop [Clear Lung Fields] : clear lung fields [Good Air Entry] : good air entry [No Respiratory Distress] : no respiratory distress  [Soft] : abdomen soft [Non Tender] : non-tender [No Masses/organomegaly] : no masses/organomegaly [Normal Bowel Sounds] : normal bowel sounds [Normal Gait] : normal gait [No Edema] : no edema [No Cyanosis] : no cyanosis [No Clubbing] : no clubbing [No Varicosities] : no varicosities [No Rash] : no rash [No Skin Lesions] : no skin lesions [Moves all extremities] : moves all extremities [No Focal Deficits] : no focal deficits [Normal Speech] : normal speech [Alert and Oriented] : alert and oriented [Normal memory] : normal memory [General Appearance - Well Developed] : well developed [Normal Appearance] : normal appearance [Well Groomed] : well groomed [General Appearance - Well Nourished] : well nourished [No Deformities] : no deformities [General Appearance - In No Acute Distress] : no acute distress [Normal Conjunctiva] : the conjunctiva exhibited no abnormalities [Eyelids - No Xanthelasma] : the eyelids demonstrated no xanthelasmas [Normal Oral Mucosa] : normal oral mucosa [No Oral Pallor] : no oral pallor [No Oral Cyanosis] : no oral cyanosis [Normal Jugular Venous A Waves Present] : normal jugular venous A waves present [Normal Jugular Venous V Waves Present] : normal jugular venous V waves present [No Jugular Venous Ceron A Waves] : no jugular venous ceron A waves [Respiration, Rhythm And Depth] : normal respiratory rhythm and effort [Exaggerated Use Of Accessory Muscles For Inspiration] : no accessory muscle use [Auscultation Breath Sounds / Voice Sounds] : lungs were clear to auscultation bilaterally [Heart Rate And Rhythm] : heart rate and rhythm were normal [Heart Sounds] : normal S1 and S2 [Murmurs] : no murmurs present [Abdomen Soft] : soft [Abdomen Tenderness] : non-tender [Abdomen Mass (___ Cm)] : no abdominal mass palpated [Abnormal Walk] : normal gait [Gait - Sufficient For Exercise Testing] : the gait was sufficient for exercise testing [Nail Clubbing] : no clubbing of the fingernails [Cyanosis, Localized] : no localized cyanosis [Petechial Hemorrhages (___cm)] : no petechial hemorrhages [Skin Color & Pigmentation] : normal skin color and pigmentation [] : no rash [No Venous Stasis] : no venous stasis [Skin Lesions] : no skin lesions [No Skin Ulcers] : no skin ulcer [No Xanthoma] : no  xanthoma was observed [Oriented To Time, Place, And Person] : oriented to person, place, and time [Affect] : the affect was normal [Mood] : the mood was normal [No Anxiety] : not feeling anxious [FreeTextEntry1] : JEREMIAH to carotids

## 2022-06-20 NOTE — PROCEDURE
[Size: ______] : The left ventricular size is [unfilled] [Wall Motion: ______] : The left ventricular wall motion is [unfilled] [Ejection Fraction: ______] : The left ventricular ejection fraction is [unfilled] [Normal] : 3. No pericardial effusion. [ECG Atrial Arrhythmias] : no arrhythmias [de-identified] : 134 [de-identified] : 180/80 [de-identified] : 75%; METS 7.4 [de-identified] : mild left ventricular global hypokinesis LVEF 45-50% [de-identified] : Submaximal stress test target heart rate was not achieved no wall motion abnormalities post stress images no wall motion abnormalities [TWNoteComboBox1] : АНДРЕЙ [TWNoteComboBox2] : Doug [TWNoteComboBox4] : dyspnea [TWNoteComboBox3] : 2 [TWNoteComboBox5] : dyspnea [de-identified] : Dr. Pascual Malhotra (card) [de-identified] : Nicole Petri-Schoener, [facility] [de-identified] : Chest Pain [de-identified] : 1.0 [de-identified] : 0.9 [de-identified] : 4.8 [de-identified] : 3.8 [de-identified] : 2.9 [de-identified] : 3.5 [de-identified] : 34 mmHg [de-identified] : 45-50% [de-identified] : MIld global hypokinesis  [de-identified] : The left atrial volume index is 25 ml/m2. grade 1 diastolic dysfunction  [de-identified] : Trileaflet with Calcified leaflets mild to moderate aortic eccentric insufficiency aortic sclerosis without stenosis  [de-identified] : There is trace pulmonic regurgitation.   [de-identified] : Trace to mild tricuspid regurgitation. [de-identified] : The inferior vena cava is 1.6 cm. with greater than 50% [de-identified] : Mildly reduced left ventricular systolic function mild left ventricular hypokinesis normal right ventricle [de-identified] : Thickened mitral valve leaflets mild to moderate mitral regurgitation thickedn calcified aortic valve leaflets without stenosis mild to moderate aortic insufficiency [FreeTextEntry1] : : 1980\par Age: 38y\par Sex: F\par BP: 138/72\par Height: 167 cm\par Weight: 97 kg\par BSA: 2.1 m2

## 2022-06-20 NOTE — HISTORY OF PRESENT ILLNESS
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 Med non compliance\par \par Here post hospitalization for acute on chronic systolic HF cathd one with RCA occlusion and L-R collaterals \par EF remained at 45% she is here for fu reports she was having palpitations degenerated to shortness of breath since she was in the hospital she has palpitations if she does too much symptoms occurred despite taking her BP medications she is still sob but better \par \par \par ECG NSR  LVH 6/16/2022\par \par 6/3/2022 echo mild to moderate AI Dilated RV Mild AS moderate MR Severe TR EF 45-50%\par RCH RAP 20 mmHg RVP 38/20 mmHg PA 35/20 mmHg mPAP 25 mmHg PCWP 15 TPG 10 CO 6.97 CI 3.4 \par Cardiac CAth 6/3/2022 Occluded RCA with collaterals \par CCTA score 75 occluded RCA

## 2022-06-29 ENCOUNTER — APPOINTMENT (OUTPATIENT)
Dept: HEART AND VASCULAR | Facility: CLINIC | Age: 42
End: 2022-06-29

## 2022-06-29 PROCEDURE — 93228 REMOTE 30 DAY ECG REV/REPORT: CPT

## 2022-07-08 ENCOUNTER — APPOINTMENT (OUTPATIENT)
Dept: HEART AND VASCULAR | Facility: CLINIC | Age: 42
End: 2022-07-08

## 2022-07-08 VITALS
OXYGEN SATURATION: 96 % | DIASTOLIC BLOOD PRESSURE: 80 MMHG | SYSTOLIC BLOOD PRESSURE: 120 MMHG | WEIGHT: 199 LBS | HEART RATE: 82 BPM | BODY MASS INDEX: 33.15 KG/M2 | TEMPERATURE: 97.7 F | HEIGHT: 65 IN

## 2022-07-08 DIAGNOSIS — I35.0 NONRHEUMATIC AORTIC (VALVE) STENOSIS: ICD-10-CM

## 2022-07-08 PROCEDURE — 99215 OFFICE O/P EST HI 40 MIN: CPT

## 2022-07-08 RX ORDER — LEVOTHYROXINE SODIUM 0.07 MG/1
75 TABLET ORAL DAILY
Qty: 90 | Refills: 1 | Status: DISCONTINUED | COMMUNITY
End: 2022-07-08

## 2022-07-08 RX ORDER — VALSARTAN 40 MG/1
40 TABLET, COATED ORAL
Qty: 60 | Refills: 5 | Status: DISCONTINUED | COMMUNITY
Start: 2022-06-07 | End: 2022-07-08

## 2022-07-09 PROBLEM — I35.0 AORTIC STENOSIS: Status: ACTIVE | Noted: 2017-06-13

## 2022-07-09 NOTE — PHYSICAL EXAM
[Obese] : obese [No Xanthelasma] : no xanthelasma [Normal S1, S2] : normal S1, S2 [No Rub] : no rub [No Gallop] : no gallop [Murmur] : murmur [Soft] : abdomen soft [Non Tender] : non-tender [Normal Bowel Sounds] : normal bowel sounds [Normal Radial B/L] : normal radial B/L [Normal] : alert and oriented, normal memory [de-identified] : not assessed - wearing a face mask [de-identified] : JVP 8 cm H2O, no visible v-wave [de-identified] : [facility] [de-identified] : unable to appreciate HSM or masses due to body habitus

## 2022-07-09 NOTE — CARDIOLOGY SUMMARY
[de-identified] : \par 06/04/22 ECG: Junctional rhythm at 47 bpm, LVH by voltage, nonspecific ST-T wave abnormalities. [de-identified] : \par 10/25/18 Exercise TTE (Doug): baseline HR 83, /60, Peak , Peak /80, 75% MPHR, 7.4 METS. 1. Decreased functional capacity. 2. Normal ECG response to treadmill exercise. 3. Submaximal stress test; no wall motion abnormalities post stress. LVEF 45-50% (global), LVIDd 4.8 cm, normal RV size & function, normal biatrial size, calcified trileaflet AV with mild-mod eccentric AR, aortic sclerosis w/o stenosis, mild-mod MR, mild TR, normal IVC with normal respiratory variation. [de-identified] : \par 06/03/22 TTE: LVIDd 5.95 cm, LVEF 45-50%, mild LVH (SW 0.98, PW 0.88), abnormal septal motion due to RV overload, grade 1 DD, RVE with decreased RVF, TAPSE 12.1 mm (low), RV tissue Doppler 7.94 cm/s (low), PRINCE, thickened and calcified AV, mild AS (peak 2.61 m/s, mean 15 mmHg, mild-mod AR, mod MR, severe unrestricted TR, est PASP 36 mmHg (likely underestimated), mild NC. Dilated IVC with abnormal inspiratory collapse, trivial pericardial effusion. \par \par 12/30/22 TTE: LVIDd 5.6 cm, LVEF 45-50% (global), mild LVH (SW 1.1, PW 1.2), grade 1 DD, mild LAE, normal RA size, calcified trileaflet AV with mild AS (mean 16 mmHg, peak 29 mmHg, DANIA 1.4 cm2), mild AR, SVI 41 ml/m2, mild-mod MR, mild-mod TR, mild PH (RVSP 39 mmHg), normal IVC with normal respiratory variation. [de-identified] : \par 06/04/22 LHC: Ao 113/57/79, LVEDP 127/12 (report states LVEDP 6), HR 52. Flush occlusion of oRCA with L-R collaterals, LAD & LCx luminal irregularities, no sig gradient across the aortic valve (14 mmHg).\par \par 06/06/22 RHC: /70/100, HR 67. RA 20, RV 38/20, PA 35/20/25, PCW 15, Bill 0.75, RVSWI 3.21 g*m/m2, CO/CI (F) 6.97/3.4, Ao 98%, PA 68%, PVR 1.4 Byers,  dsc,  1.55 W, RA/PCW 1.33. Hemodynamics were consistent with RV infarct. [de-identified] : \par 06/03/22 BLE Doppler: No DVT either LE.\par \par 06/03/22 Coronary CTA: CAC 75, oRCA and its most proximal segment are occluded secondary to calcified and noncalcified plaque.\par \par 06/03/22 CTPA: Negative for PE. Mild cardiomegaly, CAC, no AV calcification, no MAC, no YORDAN thrombus, no pleural or pericardial effusion, no BHARATI, mild patchy nodular consolidative opacity within the bilateral upper lobe (pneumonitis), overall decreased since 12/2015. No pulmonary consolidation or mass

## 2022-07-09 NOTE — DISCUSSION/SUMMARY
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 She is ACC/AHA Stage C, NYHA Class III HF, euvolemic on exam. I have recommended the following:\par \par 1. NICM, LVEF 45-50% with RV involvement - Although this is R>L HF due to recent RV infarct, she has accompanying diastolic impairment. Would start Farxiga 10 mg once daily. Will also switch valsartan 40 mg BID to Entresto 24-26 BID since she is a woman with hypertension and there was a trend towards better outcomes in this population in the ARNI HFpEF studies. Adding Toprol XL 25 mg once daily now given CAD. Continue torsemide 20 mg once daily and spironolactone 25 mg once daily, but can reassess at next visit with Dr. Malhotra since she will be on Farxiga and Entresto and may not need the loop diuretic. Labs ordered for 7/20 to monitor K and electrolytes.\par \par 2. Junctional bradycardia - Not bradycardic today. Added low dose beta blocker as above. Will request ECG at next visit with Dr. Malhotra.\par \par 3. CAD/NSTEMI - no symptoms or signs of angina. I suspect this is from her chest XRT. Added beta blocker, as above. Continue ARNI, MRA, statin, ASA.\par \par 4. Hypertension - well controlled on current therapies. Mild LVH and DD on TTE.\par \par 5. Mild AS - I suspect this is from her chest XRT. No intervention currently needed. Will monitor with annual echos for now.\par \par 6. Severe TR - in setting of RV infarct. Clinical exam without suggestion that it remains severe currently. Good volume status. Meds as noted above.\par \par 7. Follow-up with HF [facility] team every 2-3 weeks for med uptitration and with me in 3 months. Has routine f/u scheduled with Dr. Malhotra, as well.

## 2022-07-09 NOTE — REASON FOR VISIT
[Cardiac Failure] : cardiac failure [FreeTextEntry1] : PATIENT INSTRUCTIONS:\par \par 1. START Farxiga 10 mg once daily. If prior authorization is required, it will be obtained by Dr. Lucio' office.\par \par 2. START Metoprolol SUCCINATE 25 mg once daily at bedtime. This is a medication to help reduce stress and strain on your heart.\par \par 3. SWITCH the valsartan to ENTRESTO 24-26 twice a day.  If prior authorization is required, it will be obtained by Dr. Lucio' office.\par \par 4. Please monitor home blood pressures. If possible, buy an [employer]ron machine that uses an upper arm cuff. Please keep a log of your blood pressure, heart rate, and weight taken daily. You may check your blood pressure and heart rate at a random time, preferably when you are at rest. Your weight should be checked first thing in the morning upon wakening and after using the bathroom. \par \par 5. Please have labs drawn when you see Dr. Malhotra. The orders will be in your chart.\par \par 6. We will arrange TeleHealth follow-up with the Heart Failure NPs every 2-3 weeks to escalate the medications, as tolerated. Follow-up with Dr. Lucio in 3 months.

## 2022-07-20 ENCOUNTER — APPOINTMENT (OUTPATIENT)
Dept: HEART AND VASCULAR | Facility: CLINIC | Age: 42
End: 2022-07-20

## 2022-07-20 VITALS
OXYGEN SATURATION: 97 % | DIASTOLIC BLOOD PRESSURE: 72 MMHG | WEIGHT: 198 LBS | HEIGHT: 65 IN | SYSTOLIC BLOOD PRESSURE: 135 MMHG | BODY MASS INDEX: 32.99 KG/M2 | HEART RATE: 72 BPM

## 2022-07-20 PROCEDURE — 36415 COLL VENOUS BLD VENIPUNCTURE: CPT

## 2022-07-20 PROCEDURE — 99214 OFFICE O/P EST MOD 30 MIN: CPT | Mod: 25

## 2022-07-21 NOTE — PROCEDURE
[Size: ______] : The left ventricular size is [unfilled] [Wall Motion: ______] : The left ventricular wall motion is [unfilled] [Ejection Fraction: ______] : The left ventricular ejection fraction is [unfilled] [Normal] : 3. No pericardial effusion. [ECG Atrial Arrhythmias] : no arrhythmias [de-identified] : 134 [de-identified] : 180/80 [de-identified] : 75%; METS 7.4 [de-identified] : mild left ventricular global hypokinesis LVEF 45-50% [de-identified] : Submaximal stress test target heart rate was not achieved no wall motion abnormalities post stress images no wall motion abnormalities [TWNoteComboBox1] : АНДРЕЙ [TWNoteComboBox2] : Doug [TWNoteComboBox4] : dyspnea [TWNoteComboBox3] : 2 [TWNoteComboBox5] : dyspnea [de-identified] : Dr. Pascual Malhotra (card) [de-identified] : Nicole Petri-Schoener, [facility] [de-identified] : Chest Pain [de-identified] : 1.0 [de-identified] : 0.9 [de-identified] : 4.8 [de-identified] : 3.8 [de-identified] : 2.9 [de-identified] : 3.5 [de-identified] : 34 mmHg [de-identified] : 45-50% [de-identified] : MIld global hypokinesis  [de-identified] : The left atrial volume index is 25 ml/m2. grade 1 diastolic dysfunction  [de-identified] : Trileaflet with Calcified leaflets mild to moderate aortic eccentric insufficiency aortic sclerosis without stenosis  [de-identified] : There is trace pulmonic regurgitation.   [de-identified] : Trace to mild tricuspid regurgitation. [de-identified] : The inferior vena cava is 1.6 cm. with greater than 50% [de-identified] : Mildly reduced left ventricular systolic function mild left ventricular hypokinesis normal right ventricle [de-identified] : Thickened mitral valve leaflets mild to moderate mitral regurgitation thickedn calcified aortic valve leaflets without stenosis mild to moderate aortic insufficiency [FreeTextEntry1] : : 1980\par Age: 38y\par Sex: F\par BP: 138/72\par Height: 167 cm\par Weight: 97 kg\par BSA: 2.1 m2

## 2022-07-21 NOTE — ASSESSMENT
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

## 2022-07-22 ENCOUNTER — NON-APPOINTMENT (OUTPATIENT)
Age: 42
End: 2022-07-22

## 2022-07-22 LAB
ANION GAP SERPL CALC-SCNC: 14 MMOL/L
BUN SERPL-MCNC: 19 MG/DL
CALCIUM SERPL-MCNC: 9.6 MG/DL
CHLORIDE SERPL-SCNC: 96 MMOL/L
CO2 SERPL-SCNC: 26 MMOL/L
CREAT SERPL-MCNC: 0.84 MG/DL
EGFR: 89 ML/MIN/1.73M2
GLUCOSE SERPL-MCNC: 106 MG/DL
MAGNESIUM SERPL-MCNC: 1.8 MG/DL
NT-PROBNP SERPL-MCNC: 103 PG/ML
POTASSIUM SERPL-SCNC: 3.5 MMOL/L
SODIUM SERPL-SCNC: 137 MMOL/L

## 2022-07-26 ENCOUNTER — APPOINTMENT (OUTPATIENT)
Dept: HEART FAILURE | Facility: CLINIC | Age: 42
End: 2022-07-26

## 2022-07-26 DIAGNOSIS — I07.1 RHEUMATIC TRICUSPID INSUFFICIENCY: ICD-10-CM

## 2022-07-26 PROCEDURE — 99442: CPT | Mod: 95

## 2022-08-04 ENCOUNTER — APPOINTMENT (OUTPATIENT)
Dept: HEART AND VASCULAR | Facility: CLINIC | Age: 42
End: 2022-08-04

## 2022-08-04 DIAGNOSIS — I51.89 OTHER ILL-DEFINED HEART DISEASES: ICD-10-CM

## 2022-08-04 PROCEDURE — 93306 TTE W/DOPPLER COMPLETE: CPT

## 2022-08-08 ENCOUNTER — NON-APPOINTMENT (OUTPATIENT)
Age: 42
End: 2022-08-08

## 2022-08-08 PROBLEM — I51.89 CARDIAC MASS: Status: ACTIVE | Noted: 2022-08-08

## 2022-08-08 LAB
ANION GAP SERPL CALC-SCNC: 13 MMOL/L
BUN SERPL-MCNC: 18 MG/DL
CALCIUM SERPL-MCNC: 10 MG/DL
CHLORIDE SERPL-SCNC: 97 MMOL/L
CO2 SERPL-SCNC: 30 MMOL/L
CREAT SERPL-MCNC: 0.98 MG/DL
EGFR: 74 ML/MIN/1.73M2
GLUCOSE SERPL-MCNC: 112 MG/DL
POTASSIUM SERPL-SCNC: 3.6 MMOL/L
SODIUM SERPL-SCNC: 139 MMOL/L

## 2022-08-12 ENCOUNTER — APPOINTMENT (OUTPATIENT)
Dept: HEART FAILURE | Facility: CLINIC | Age: 42
End: 2022-08-12

## 2022-08-19 ENCOUNTER — OUTPATIENT (OUTPATIENT)
Dept: OUTPATIENT SERVICES | Facility: HOSPITAL | Age: 42
LOS: 1 days | End: 2022-08-19
Payer: MEDICAID

## 2022-08-19 ENCOUNTER — APPOINTMENT (OUTPATIENT)
Dept: MRI IMAGING | Facility: HOSPITAL | Age: 42
End: 2022-08-19

## 2022-08-19 LAB — POCT ISTAT CREATININE: 1 MG/DL — SIGNIFICANT CHANGE UP (ref 0.5–1.3)

## 2022-08-19 PROCEDURE — 75561 CARDIAC MRI FOR MORPH W/DYE: CPT

## 2022-08-19 PROCEDURE — 75561 CARDIAC MRI FOR MORPH W/DYE: CPT | Mod: 26

## 2022-08-19 PROCEDURE — 82565 ASSAY OF CREATININE: CPT

## 2022-08-19 PROCEDURE — A9577: CPT

## 2022-08-24 ENCOUNTER — APPOINTMENT (OUTPATIENT)
Dept: HEART FAILURE | Facility: CLINIC | Age: 42
End: 2022-08-24

## 2022-08-24 ENCOUNTER — NON-APPOINTMENT (OUTPATIENT)
Age: 42
End: 2022-08-24

## 2022-08-24 DIAGNOSIS — R00.1 BRADYCARDIA, UNSPECIFIED: ICD-10-CM

## 2022-08-24 PROCEDURE — 99443: CPT | Mod: 95

## 2022-09-14 ENCOUNTER — APPOINTMENT (OUTPATIENT)
Dept: PULMONOLOGY | Facility: CLINIC | Age: 42
End: 2022-09-14

## 2022-09-14 VITALS
OXYGEN SATURATION: 96 % | HEIGHT: 65 IN | TEMPERATURE: 97.2 F | SYSTOLIC BLOOD PRESSURE: 152 MMHG | BODY MASS INDEX: 34.99 KG/M2 | WEIGHT: 210 LBS | DIASTOLIC BLOOD PRESSURE: 85 MMHG | HEART RATE: 87 BPM | RESPIRATION RATE: 12 BRPM

## 2022-09-14 DIAGNOSIS — R93.89 ABNORMAL FINDINGS ON DIAGNOSTIC IMAGING OF OTHER SPECIFIED BODY STRUCTURES: ICD-10-CM

## 2022-09-14 PROCEDURE — 99215 OFFICE O/P EST HI 40 MIN: CPT

## 2022-09-14 NOTE — CURRENT MEDS
See updated POC in POC.                        [Takes medication as prescribed] : takes [None] : Patient does not have any barriers to medication adherence

## 2022-09-22 PROBLEM — R93.89 ABNORMAL CXR (CHEST X-RAY): Status: ACTIVE | Noted: 2022-09-22

## 2022-09-22 NOTE — HISTORY OF PRESENT ILLNESS
[Never] : never [TextBox_4] : 41F with history of postpartum cardiomyopathy, NHL s/p chemotherapy and radiation (1999), HFrEF who presents for hospital follow up. Patient was hospitalized in 6/2022 for CHF exacerbation found to have acute on chronic HF s/p R heart and L heart catheterization. Shewas treated and optimized medically. Since then, patient has been well. No limitation to exercise capacity, can go up stairs, follow up with Dr. Malhotra and Dr. Lucio with good progress and medication compliance. 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 Otherwise, no changes to medications, no new allergies.  [ESS] : 0

## 2022-09-22 NOTE — ASSESSMENT
[FreeTextEntry1] : 41F with history of postpartum cardiomyopathy, NHL s/p chemotherapy and radiation (1999), HFrEF who presents for hospital follow up. Patient was hospitalized in 6/2022 for CHF exacerbation found to have acute on chronic HF.\par \par #Apical scarring\par Patient with remote history of bleomycin use and remote history of pneumonitis with abnormal CT chest in the past with apical scarring. When compared with 2015 the areas of cystic changes and GGO are improved overall. May represent scarring from prior pneumonitis or organizing pneumonia. She is not limited in her exertion at this time and is not experiencing any shortness of breath. There is no suggestion of any other pulmonary disease at this time. Continue cardiac follow up and goal directed medical therapy. Will plan to repeat CT chest in 1 year.\par -Repeat CT chest in 1 year 6/2022\par Return sooner if new shortness of breath occurs.

## 2022-09-22 NOTE — END OF VISIT
[] : Fellow [Time Spent: ___ minutes] : I have spent [unfilled] minutes of time on the encounter. [>50% of the face to face encounter time was spent on counseling and/or coordination of care for ___] : Greater than 50% of the face to face encounter time was spent on counseling and/or coordination of care for [unfilled] [FreeTextEntry2] : she improved.  reviewed the CT scan with her yearly CXR

## 2022-09-22 NOTE — PHYSICAL EXAM
[No Acute Distress] : no acute distress [Normal Oropharynx] : normal oropharynx [Normal Appearance] : normal appearance [No Neck Mass] : no neck mass [Normal Rate/Rhythm] : normal rate/rhythm [Normal S1, S2] : normal s1, s2 [No Resp Distress] : no resp distress [Clear to Auscultation Bilaterally] : clear to auscultation bilaterally [No Abnormalities] : no abnormalities [Benign] : benign [Normal Gait] : normal gait [No Clubbing] : no clubbing [No Cyanosis] : no cyanosis [No Edema] : no edema [FROM] : FROM [Normal Color/ Pigmentation] : normal color/ pigmentation [No Focal Deficits] : no focal deficits [Oriented x3] : oriented x3 [Normal Affect] : normal affect [TextBox_44] : +JVD mild [TextBox_54] : 3/6 systolic murmur best at RUSB

## 2022-09-22 NOTE — PROCEDURE
[FreeTextEntry1] : 6/3/2022 echo mild to moderate AI Dilated RV Mild AS moderate MR Severe TR EF 45-50%\par \par RHC 6/6/22: RAP 20 mmHg RVP 38/20 mmHg PA 35/20 mmHg mPAP 25 mmHg PCWP 15 TPG 10 CO 6.97 CI 3.4 \par \par CT chest 6/3/22: \par \par LUNG PARENCHYMA / AIRWAYS: Evaluation of the lung parenchyma demonstrates no endobronchial lesion. Mild patchy nodular consolidative opacity within the bilateral upper lobe, overall decreased since 12/2015. No pulmonary consolidation or mass.

## 2022-10-28 ENCOUNTER — APPOINTMENT (OUTPATIENT)
Dept: HEART AND VASCULAR | Facility: CLINIC | Age: 42
End: 2022-10-28

## 2022-11-01 NOTE — PHYSICAL EXAM
[de-identified] : not assessed - wearing a face mask [de-identified] : JVP 8 cm H2O, no visible v-wave [de-identified] : III/VI JEREMIAH at [facility] [de-identified] : unable to appreciate HSM or masses due to body habitus

## 2022-11-01 NOTE — REASON FOR VISIT
[FreeTextEntry1] : PATIENT INSTRUCTIONS:\par \par 1. START Farxiga 10 mg once daily. If prior authorization is required, it will be obtained by Dr. Lucio' office.\par \par 2. START Metoprolol SUCCINATE 25 mg once daily at bedtime. This is a medication to help reduce stress and strain on your heart.\par \par 3. SWITCH the valsartan to ENTRESTO 24-26 twice a day.  If prior authorization is required, it will be obtained by Dr. Lucio' office.\par \par 4. Please monitor home blood pressures. If possible, buy an [employer] machine that uses an upper arm cuff. Please keep a log of your blood pressure, heart rate, and weight taken daily. You may check your blood pressure and heart rate at a random time, preferably when you are at rest. Your weight should be checked first thing in the morning upon wakening and after using the bathroom. \par \par 5. Please have labs drawn when you see Dr. Malhotra. The orders will be in your chart.\par \par 6. We will arrange TeleHealth follow-up with the Heart Failure NPs every 2-3 weeks to escalate the medications, as tolerated. Follow-up with Dr. uLcio in 3 months.

## 2022-11-01 NOTE — HISTORY OF PRESENT ILLNESS
[FreeTextEntry1] : This is a do, 41 yo F with NICM dx initially in 2001  (EF 30%, labeled as PPCM), current LVEF 60% by cMRI (8/2022). PMHx also significant for non-Hodgkin's lymphoma (1988 doxorubicin/bleomycin & XRT to chest) in remission, h/o bleomycin pulm toxicity, HTN, pre-DM, mild AS (likely due to XRT) who was hospitalized 6/3-6/7/22 with NSTEMI. At that time, she had worsening BRASWELL and b/l LE edema. BLE Dopplers negative for DVT & CTPA was negative for PE. Presents today for follow up on her cardiomyopathy.\par \par LHC/RHC was done (details below) and consistent with RV infarct. She had a junctional bradycardia during the admission so was not sent home on a beta blocker and was scheduled for an outpatient Holter with Dr. Malhotra. Her discharge weight was 211.6#.\par \par \par Since her last OV on 7/8/2022 she has followed on several occassions with the [facility] and Dr. Malhotra to continue to maximize her medical therapies, a subsequent cMRI in August of 2022, demonstrated biventricular recovery (detailed report below)\par \par In the interval she continues to participate in an active lifestyle, trying to walk everywhere, can walk 20 blocks and take the stairs on the subway without any limitations. She has mild LE edema, that accumulates at the end of the day, as she sits at a desk for 8+ hours daily. Which will resolve with elevation. She has started taking her BP/Vitals/Weight: -120s. DBP 78-85. HR 70s. Weight 195# Lost 5# Intentionally lost ~5 pounds. She is reluctant to change her medications as she is doing so well. \par \par \par

## 2022-11-01 NOTE — DISCUSSION/SUMMARY
[FreeTextEntry1] : 40 YO F with a recent RV infarct (6/3/22) and PMH of non-Hodgkins lymphoma (1988) requiring doxorubicin/bleomycin then XRT, now in remission, subsequent PPCM (2001; although tolerated 2014 pregnancy) reportedly with an initial LVEF 30%, now with biventricular recovery by cMRI with LVEF of 60%.  She is ACC/AHA Stage C, NYHA Class III HF, euvolemic on exam. I have recommended the following:\par \par 1. NICM- stable and well compensated and most recently w/ biventricular recovery on GDMT. Continue with Entresto 49/51 mg BID, Farxiga 10 mg QD, Spironolactone 25 QD, Toprol XL 50 mg QD. Decrease Torsemide 10 mg to M/W/F. Will not escalate her GDMT currently as her her EF has recovered and BP is well controlled. Continue with BP and weight log. Encourage daily exercise regimen. \par \par \par 2. Junctional bradycardia - Not bradycardic today. Added low dose beta blocker as above. Will request ECG at next visit with Dr. Malhotra.\par \par 3. CAD/NSTEMI - no symptoms or signs of angina. I suspect this is from her chest XRT. Added beta blocker, as above. Continue ARNI, MRA, statin, ASA.\par \par 4. Hypertension - well controlled on current therapies. Mild LVH and DD on TTE.\par \par 5. Mild AS - I suspect this is from her chest XRT. No intervention currently needed. Will monitor with annual echos for now.\par \par 6. Severe TR - in setting of RV infarct. Clinical exam without suggestion that it remains severe currently. Good volume status. Meds as noted above.\par \par 7. Follow-up with [facility] team every 2-3 weeks for med uptitration and with me in 3 months. Has routine f/u scheduled with Dr. Malhotra, as well.

## 2022-11-01 NOTE — CARDIOLOGY SUMMARY
[de-identified] : \par 06/04/22 ECG: Junctional rhythm at 47 bpm, LVH by voltage, nonspecific ST-T wave abnormalities. [de-identified] : \par 10/25/18 Exercise TTE (Doug): baseline HR 83, /60, Peak , Peak /80, 75% MPHR, 7.4 METS. 1. Decreased functional capacity. 2. Normal ECG response to treadmill exercise. 3. Submaximal stress test; no wall motion abnormalities post stress. LVEF 45-50% (global), LVIDd 4.8 cm, normal RV size & function, normal biatrial size, calcified trileaflet AV with mild-mod eccentric AR, aortic sclerosis w/o stenosis, mild-mod MR, mild TR, normal IVC with normal respiratory variation. [de-identified] : \par 06/03/22 TTE: LVIDd 5.95 cm, LVEF 45-50%, mild LVH (SW 0.98, PW 0.88), abnormal septal motion due to RV overload, grade 1 DD, RVE with decreased RVF, TAPSE 12.1 mm (low), RV tissue Doppler 7.94 cm/s (low), PRINCE, thickened and calcified AV, mild AS (peak 2.61 m/s, mean 15 mmHg, mild-mod AR, mod MR, severe unrestricted TR, est PASP 36 mmHg (likely underestimated), mild IA. Dilated IVC with abnormal inspiratory collapse, trivial pericardial effusion. \par \par 12/30/22 TTE: LVIDd 5.6 cm, LVEF 45-50% (global), mild LVH (SW 1.1, PW 1.2), grade 1 DD, mild LAE, normal RA size, calcified trileaflet AV with mild AS (mean 16 mmHg, peak 29 mmHg, DANIA 1.4 cm2), mild AR, SVI 41 ml/m2, mild-mod MR, mild-mod TR, mild PH (RVSP 39 mmHg), normal IVC with normal respiratory variation. [de-identified] : \par 8/19/22 cMRI: LVEF 60%, LV is normal in size and function, there is mild concentric LVH, RVEF 68%, the RV is normal in size and function, mild MR, mild AI, small pericardial effusion noted. On LGE there is no evidence of myocardial scarring.  [de-identified] : \par 06/04/22 LHC: Ao 113/57/79, LVEDP 127/12 (report states LVEDP 6), HR 52. Flush occlusion of oRCA with L-R collaterals, LAD & LCx luminal irregularities, no sig gradient across the aortic valve (14 mmHg).\par \par 06/06/22 RHC: /70/100, HR 67. RA 20, RV 38/20, PA 35/20/25, PCW 15, Bill 0.75, RVSWI 3.21 g*m/m2, CO/CI (F) 6.97/3.4, Ao 98%, PA 68%, PVR 1.4 Byers,  dsc,  1.55 W, RA/PCW 1.33. Hemodynamics were consistent with RV infarct. [de-identified] : \par 06/03/22 BLE Doppler: No DVT either LE.\par \par 06/03/22 Coronary CTA: CAC 75, oRCA and its most proximal segment are occluded secondary to calcified and noncalcified plaque.\par \par 06/03/22 CTPA: Negative for PE. Mild cardiomegaly, CAC, no AV calcification, no MAC, no YORDAN thrombus, no pleural or pericardial effusion, no BHARATI, mild patchy nodular consolidative opacity within the bilateral upper lobe (pneumonitis), overall decreased since 12/2015. No pulmonary consolidation or mass

## 2022-11-16 ENCOUNTER — APPOINTMENT (OUTPATIENT)
Dept: HEART AND VASCULAR | Facility: CLINIC | Age: 42
End: 2022-11-16

## 2022-11-16 VITALS
HEART RATE: 95 BPM | SYSTOLIC BLOOD PRESSURE: 140 MMHG | TEMPERATURE: 97.9 F | WEIGHT: 209 LBS | DIASTOLIC BLOOD PRESSURE: 82 MMHG | OXYGEN SATURATION: 95 % | BODY MASS INDEX: 34.82 KG/M2 | HEIGHT: 65 IN

## 2022-11-16 PROCEDURE — 36415 COLL VENOUS BLD VENIPUNCTURE: CPT

## 2022-11-16 PROCEDURE — G0008: CPT

## 2022-11-16 PROCEDURE — 99214 OFFICE O/P EST MOD 30 MIN: CPT | Mod: 25

## 2022-11-16 PROCEDURE — 90686 IIV4 VACC NO PRSV 0.5 ML IM: CPT

## 2022-11-16 RX ORDER — DAPAGLIFLOZIN 10 MG/1
10 TABLET, FILM COATED ORAL DAILY
Qty: 30 | Refills: 5 | Status: DISCONTINUED | COMMUNITY
Start: 2022-07-08 | End: 2022-11-16

## 2022-11-16 RX ORDER — TORSEMIDE 20 MG/1
20 TABLET ORAL
Qty: 60 | Refills: 2 | Status: ACTIVE | COMMUNITY
Start: 2022-06-07

## 2022-11-16 RX ORDER — LEVOTHYROXINE SODIUM 0.07 MG/1
75 TABLET ORAL DAILY
Qty: 30 | Refills: 5 | Status: DISCONTINUED | COMMUNITY
Start: 2018-11-08 | End: 2022-11-16

## 2022-11-16 RX ORDER — SACUBITRIL AND VALSARTAN 24; 26 MG/1; MG/1
24-26 TABLET, FILM COATED ORAL
Qty: 60 | Refills: 0 | Status: DISCONTINUED | COMMUNITY
Start: 2022-07-08 | End: 2022-11-16

## 2022-11-17 LAB
ALBUMIN SERPL ELPH-MCNC: 4.8 G/DL
ALP BLD-CCNC: 83 U/L
ALT SERPL-CCNC: 18 U/L
ANION GAP SERPL CALC-SCNC: 21 MMOL/L
AST SERPL-CCNC: 14 U/L
BASOPHILS # BLD AUTO: 0.07 K/UL
BASOPHILS NFR BLD AUTO: 0.7 %
BILIRUB SERPL-MCNC: 0.2 MG/DL
BUN SERPL-MCNC: 15 MG/DL
CALCIUM SERPL-MCNC: 10.3 MG/DL
CHLORIDE SERPL-SCNC: 97 MMOL/L
CHOLEST SERPL-MCNC: 235 MG/DL
CO2 SERPL-SCNC: 22 MMOL/L
CREAT SERPL-MCNC: 0.94 MG/DL
CREAT SPEC-SCNC: 342 MG/DL
EGFR: 77 ML/MIN/1.73M2
EOSINOPHIL # BLD AUTO: 0.52 K/UL
EOSINOPHIL NFR BLD AUTO: 5.2 %
ESTIMATED AVERAGE GLUCOSE: 120 MG/DL
GLUCOSE SERPL-MCNC: 105 MG/DL
HBA1C MFR BLD HPLC: 5.8 %
HCT VFR BLD CALC: 40.7 %
HDLC SERPL-MCNC: 52 MG/DL
HGB BLD-MCNC: 13 G/DL
IMM GRANULOCYTES NFR BLD AUTO: 0.2 %
LDLC SERPL CALC-MCNC: 166 MG/DL
LYMPHOCYTES # BLD AUTO: 2.63 K/UL
LYMPHOCYTES NFR BLD AUTO: 26.4 %
MAN DIFF?: NORMAL
MCHC RBC-ENTMCNC: 27.3 PG
MCHC RBC-ENTMCNC: 31.9 GM/DL
MCV RBC AUTO: 85.5 FL
MICROALBUMIN 24H UR DL<=1MG/L-MCNC: 43.9 MG/DL
MICROALBUMIN/CREAT 24H UR-RTO: 128 MG/G
MONOCYTES # BLD AUTO: 0.59 K/UL
MONOCYTES NFR BLD AUTO: 5.9 %
NEUTROPHILS # BLD AUTO: 6.13 K/UL
NEUTROPHILS NFR BLD AUTO: 61.6 %
NONHDLC SERPL-MCNC: 183 MG/DL
NT-PROBNP SERPL-MCNC: 179 PG/ML
PLATELET # BLD AUTO: 323 K/UL
POTASSIUM SERPL-SCNC: 4.3 MMOL/L
PROT SERPL-MCNC: 8.8 G/DL
RBC # BLD: 4.76 M/UL
RBC # FLD: 13.3 %
SODIUM SERPL-SCNC: 140 MMOL/L
T4 FREE SERPL-MCNC: 1.2 NG/DL
TRIGL SERPL-MCNC: 85 MG/DL
TSH SERPL-ACNC: 13.2 UIU/ML
WBC # FLD AUTO: 9.96 K/UL

## 2022-11-17 NOTE — PHYSICAL EXAM
[Well Developed] : well developed [Well Nourished] : well nourished [No Acute Distress] : no acute distress [Normal Venous Pressure] : normal venous pressure [No Carotid Bruit] : no carotid bruit [Normal S1, S2] : normal S1, S2 [No Murmur] : no murmur [No Rub] : no rub [No Gallop] : no gallop [Clear Lung Fields] : clear lung fields [Good Air Entry] : good air entry [No Respiratory Distress] : no respiratory distress  [Soft] : abdomen soft [Non Tender] : non-tender [Normal Bowel Sounds] : normal bowel sounds [No Masses/organomegaly] : no masses/organomegaly [Normal Gait] : normal gait [No Edema] : no edema [No Cyanosis] : no cyanosis [No Clubbing] : no clubbing [No Varicosities] : no varicosities [No Rash] : no rash [No Skin Lesions] : no skin lesions [Moves all extremities] : moves all extremities [No Focal Deficits] : no focal deficits [Normal Speech] : normal speech [Alert and Oriented] : alert and oriented [Normal memory] : normal memory [General Appearance - Well Developed] : well developed [Normal Appearance] : normal appearance [Well Groomed] : well groomed [General Appearance - Well Nourished] : well nourished [No Deformities] : no deformities [General Appearance - In No Acute Distress] : no acute distress [Normal Conjunctiva] : the conjunctiva exhibited no abnormalities [Eyelids - No Xanthelasma] : the eyelids demonstrated no xanthelasmas [Normal Oral Mucosa] : normal oral mucosa [No Oral Pallor] : no oral pallor [No Oral Cyanosis] : no oral cyanosis [Normal Jugular Venous A Waves Present] : normal jugular venous A waves present [Normal Jugular Venous V Waves Present] : normal jugular venous V waves present [No Jugular Venous Ceron A Waves] : no jugular venous ceron A waves [Respiration, Rhythm And Depth] : normal respiratory rhythm and effort [Exaggerated Use Of Accessory Muscles For Inspiration] : no accessory muscle use [Auscultation Breath Sounds / Voice Sounds] : lungs were clear to auscultation bilaterally [Heart Rate And Rhythm] : heart rate and rhythm were normal [Heart Sounds] : normal S1 and S2 [Murmurs] : no murmurs present [FreeTextEntry1] : JEREMIAH to carotids [Abdomen Soft] : soft [Abdomen Tenderness] : non-tender [Abdomen Mass (___ Cm)] : no abdominal mass palpated [Abnormal Walk] : normal gait [Gait - Sufficient For Exercise Testing] : the gait was sufficient for exercise testing [Nail Clubbing] : no clubbing of the fingernails [Cyanosis, Localized] : no localized cyanosis [Petechial Hemorrhages (___cm)] : no petechial hemorrhages [Skin Color & Pigmentation] : normal skin color and pigmentation [] : no rash [No Venous Stasis] : no venous stasis [Skin Lesions] : no skin lesions [No Skin Ulcers] : no skin ulcer [No Xanthoma] : no  xanthoma was observed [Oriented To Time, Place, And Person] : oriented to person, place, and time [Affect] : the affect was normal [Mood] : the mood was normal [No Anxiety] : not feeling anxious

## 2022-11-17 NOTE — ASSESSMENT
[FreeTextEntry1] : CAD due to radiation her EF is stable she is on high dose statin \par AS/AI stable\par LV dysfunction increase entresto to 97/101 bid torsemide as needed\par she is very non compliant and i had a talk with her regarding her medication \par fu in 3 months

## 2022-11-17 NOTE — HISTORY OF PRESENT ILLNESS
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 Med non compliance NSTEMI RCA occlusion L-R collaterals 2022\par \par \par here for fu she feels great takes torsemide only as needed did not do cardiac rehab tolerating meds very well did not take her meds yet today\par \par \par ECG NSR  LVH 6/16/2022\par \par 6/3/2022 echo mild to moderate AI Dilated RV Mild AS moderate MR Severe TR EF 45-50%\par RCH RAP 20 mmHg RVP 38/20 mmHg PA 35/20 mmHg mPAP 25 mmHg PCWP 15 TPG 10 CO 6.97 CI 3.4 \par Cardiac CAth 6/3/2022 Occluded RCA with collaterals \par CCTA score 75 occluded RCA \par Cardiac MRI 8/2022 no LGE EF normal

## 2022-11-17 NOTE — PROCEDURE
[ECG Atrial Arrhythmias] : no arrhythmias [de-identified] : 180/80 [de-identified] : 134 [de-identified] : 75%; METS 7.4 [de-identified] : mild left ventricular global hypokinesis LVEF 45-50% [de-identified] : Submaximal stress test target heart rate was not achieved no wall motion abnormalities post stress images no wall motion abnormalities [TWNoteComboBox1] : АНДРЕЙ [TWNoteComboBox2] : Doug [TWNoteComboBox4] : dyspnea [TWNoteComboBox3] : 2 [TWNoteComboBox5] : dyspnea [Size: ______] : The left ventricular size is [unfilled] [Wall Motion: ______] : The left ventricular wall motion is [unfilled] [Ejection Fraction: ______] : The left ventricular ejection fraction is [unfilled] [Normal] : 3. No pericardial effusion. [de-identified] : Nicole Petri-Schoener, [facility] [de-identified] : Dr. Pascual Malhotra (card) [de-identified] : Chest Pain [de-identified] : 0.9 [de-identified] : 1.0 [de-identified] : 4.8 [de-identified] : 3.8 [de-identified] : 2.9 [de-identified] : 3.5 [de-identified] : 34 mmHg [de-identified] : 45-50% [de-identified] : MIld global hypokinesis  [de-identified] : Trileaflet with Calcified leaflets mild to moderate aortic eccentric insufficiency aortic sclerosis without stenosis  [de-identified] : The left atrial volume index is 25 ml/m2. grade 1 diastolic dysfunction  [de-identified] : There is trace pulmonic regurgitation.   [de-identified] : Trace to mild tricuspid regurgitation. [de-identified] : The inferior vena cava is 1.6 cm. with greater than 50% [de-identified] : Mildly reduced left ventricular systolic function mild left ventricular hypokinesis normal right ventricle [de-identified] : Thickened mitral valve leaflets mild to moderate mitral regurgitation thickedn calcified aortic valve leaflets without stenosis mild to moderate aortic insufficiency [FreeTextEntry1] : : 1980\par Age: 38y\par Sex: F\par BP: 138/72\par Height: 167 cm\par Weight: 97 kg\par BSA: 2.1 m2

## 2022-12-02 ENCOUNTER — APPOINTMENT (OUTPATIENT)
Dept: HEART AND VASCULAR | Facility: CLINIC | Age: 42
End: 2022-12-02

## 2022-12-02 ENCOUNTER — NON-APPOINTMENT (OUTPATIENT)
Age: 42
End: 2022-12-02

## 2022-12-15 NOTE — REASON FOR VISIT
[Cardiac Failure] : cardiac failure [FreeTextEntry1] : PATIENT INSTRUCTIONS:\par \par Last OV 7/8/2022 started on farxiga 10, entresto 24/26 and torpol 25 mg qd\par \par 8/19/22 cMRI: LVEF 60%, LV is normal in size and function, there is mild concentric LVH, RVEF 68%, the RV is normal in size and function, mild MR, mild AI, small pericardial effusion noted. On LGE there is no evidence of myocardial scarring. \par \par Labs 11/16/2022 Na 140, K 4.3, Cl 97, CO2 22, BUN 15, Cr 0.94\par \par

## 2022-12-15 NOTE — HISTORY OF PRESENT ILLNESS
[FreeTextEntry1] : This is a do, 41 yo F with NICM since 2001 (EF 30%, labeled as PPCM), now with a recovered LVEF 60% by cMRI in 8/2022, but importantly, non-Hodgkin's lymphoma (1988 doxorubicin/bleomycin & XRT to chest) in remission, h/o bleomycin pulm toxicity, HTN, pre-DM, mild AS (likely due to XRT) who was hospitalized 6/3-6/7/22 with NSTEMI. At that time, she had worsening BRASWELL and b/l LE edema. BLE Dopplers negative for DVT & CTPA was negative for PE.\par \par LHC/RHC was performed in 6/2022 (details below) and consistent with RV infarct. She had a junctional bradycardia during the admission so was not sent home on a beta blocker and was scheduled for an outpatient Holter with Dr. Malhotra.\par \par Since she was last seen in July of 2022, her entresto was escalated to moderate dose, along with her metoprolol was increased to 50mg QD and subsequent cMRI in August of 2022 demonstrated biventricular recovery (details below). \par \par In the interval she continues to participate in an active lifestyle, trying to walk everywhere, can walk 20 blocks and take the stairs on the subway without any limitations. She has mild LE edema, that accumulates at the end of the day, as she sits at a desk for 8+ hours daily. Which will resolve with elevation. She has started taking her BP/Vitals/Weight: -120s. DBP 78-85. HR 70s. Weight 195# Lost 5# Intentionally lost ~5 pounds. By "habit," she sleeps at a 20-30 degree angle, but denies true orthopnea or a sense of choking/PND. \par \par She denies CP, SOB at rest, LH/dizziness, abdominal discomfort, LE edema, palpitations, and syncope. Her appetite is normal and her bowel and bladder habits are unchanged. She does not have a PPM or ICD. She has been limiting fluid and sodium in her diet and taking her medications as directed. She does not use NSAIDs. She has not been admitted to the hospital or seen in the ER for HF in the interim.

## 2022-12-15 NOTE — DISCUSSION/SUMMARY
[Patient] : the patient [FreeTextEntry1] : 41 yo F with hx of RV infarct (6/3/22) and PMH of non-Hodgkins lymphoma (1988) requiring doxorubicin/bleomycin then XRT, now in remission, subsequent PPCM (2001; although tolerated 2014 pregnancy) reportedly with an initial LVEF 30%, now with a recovered LVEF 60% by cMRI in 8/2022 on moderate doses of GDMT. Who is overall now doing well. She is ACC/AHA Stage C, NYHA Class III HF, euvolemic on exam. I have recommended the following:\par \par 1. NICM, most recently w/ biventricular recovery on GDMT - Continue with Entresto 49/51 mg BID, Farxiga 10 mg QD, Spironolactone 25 QD, Toprol XL 50 mg QD. Decrease Torsemide 10 mg to M/W/F. Will not escalate her GDMT currently as her her EF has recovered and BP is well controlled. Continue with BP and weight log. Encourage daily exericse regimen.Labs 11/16/2022 Na 140, K 4.3, Cl 97, CO2 22, BUN 15, Cr 0.94\par \par \par 2. Junctional bradycardia - Not bradycardic today. Added low dose beta blocker as above. Will request ECG at next visit with Dr. Malhotra.\par \par 3. CAD/NSTEMI - no symptoms or signs of angina. I suspect this is from her chest XRT. Added beta blocker, as above. Continue ARNI, MRA, statin, ASA.\par \par 4. Hypertension - well controlled on current therapies. Mild LVH and DD on TTE.\par \par 5. Mild AS - I suspect this is from her chest XRT. No intervention currently needed. Will monitor with annual echos for now.\par \par 6. Severe TR - in setting of RV infarct. Clinical exam without suggestion that it remains severe currently. Good volume status. Meds as noted above.\par \par 7. Follow-up

## 2022-12-15 NOTE — CARDIOLOGY SUMMARY
[de-identified] : \par 06/04/22 ECG: Junctional rhythm at 47 bpm, LVH by voltage, nonspecific ST-T wave abnormalities. [de-identified] : \par 10/25/18 Exercise TTE (Doug): baseline HR 83, /60, Peak , Peak /80, 75% MPHR, 7.4 METS. 1. Decreased functional capacity. 2. Normal ECG response to treadmill exercise. 3. Submaximal stress test; no wall motion abnormalities post stress. LVEF 45-50% (global), LVIDd 4.8 cm, normal RV size & function, normal biatrial size, calcified trileaflet AV with mild-mod eccentric AR, aortic sclerosis w/o stenosis, mild-mod MR, mild TR, normal IVC with normal respiratory variation. [de-identified] : \par 06/03/22 TTE: LVIDd 5.95 cm, LVEF 45-50%, mild LVH (SW 0.98, PW 0.88), abnormal septal motion due to RV overload, grade 1 DD, RVE with decreased RVF, TAPSE 12.1 mm (low), RV tissue Doppler 7.94 cm/s (low), PRINCE, thickened and calcified AV, mild AS (peak 2.61 m/s, mean 15 mmHg, mild-mod AR, mod MR, severe unrestricted TR, est PASP 36 mmHg (likely underestimated), mild CA. Dilated IVC with abnormal inspiratory collapse, trivial pericardial effusion. \par \par 12/30/22 TTE: LVIDd 5.6 cm, LVEF 45-50% (global), mild LVH (SW 1.1, PW 1.2), grade 1 DD, mild LAE, normal RA size, calcified trileaflet AV with mild AS (mean 16 mmHg, peak 29 mmHg, DANIA 1.4 cm2), mild AR, SVI 41 ml/m2, mild-mod MR, mild-mod TR, mild PH (RVSP 39 mmHg), normal IVC with normal respiratory variation. [de-identified] : \par 06/04/22 LHC: Ao 113/57/79, LVEDP 127/12 (report states LVEDP 6), HR 52. Flush occlusion of oRCA with L-R collaterals, LAD & LCx luminal irregularities, no sig gradient across the aortic valve (14 mmHg).\par \par 06/06/22 RHC: /70/100, HR 67. RA 20, RV 38/20, PA 35/20/25, PCW 15, Bill 0.75, RVSWI 3.21 g*m/m2, CO/CI (F) 6.97/3.4, Ao 98%, PA 68%, PVR 1.4 Byers,  dsc,  1.55 W, RA/PCW 1.33. Hemodynamics were consistent with RV infarct. [de-identified] : \par 06/03/22 BLE Doppler: No DVT either LE.\par \par 06/03/22 Coronary CTA: CAC 75, oRCA and its most proximal segment are occluded secondary to calcified and noncalcified plaque.\par \par 06/03/22 CTPA: Negative for PE. Mild cardiomegaly, CAC, no AV calcification, no MAC, no YORDAN thrombus, no pleural or pericardial effusion, no BHARATI, mild patchy nodular consolidative opacity within the bilateral upper lobe (pneumonitis), overall decreased since 12/2015. No pulmonary consolidation or mass

## 2022-12-15 NOTE — PHYSICAL EXAM
[Obese] : obese [No Xanthelasma] : no xanthelasma [Normal S1, S2] : normal S1, S2 [No Rub] : no rub [No Gallop] : no gallop [Murmur] : murmur [Soft] : abdomen soft [Non Tender] : non-tender [Normal Bowel Sounds] : normal bowel sounds [Normal Radial B/L] : normal radial B/L [Normal] : alert and oriented, normal memory [de-identified] : not assessed - wearing a face mask [de-identified] : JVP 8 cm H2O, no visible v-wave [de-identified] : [facility] [de-identified] : unable to appreciate HSM or masses due to body habitus

## 2022-12-16 ENCOUNTER — APPOINTMENT (OUTPATIENT)
Dept: HEART AND VASCULAR | Facility: CLINIC | Age: 42
End: 2022-12-16

## 2023-02-17 ENCOUNTER — NON-APPOINTMENT (OUTPATIENT)
Age: 43
End: 2023-02-17

## 2023-02-17 ENCOUNTER — APPOINTMENT (OUTPATIENT)
Dept: HEART AND VASCULAR | Facility: CLINIC | Age: 43
End: 2023-02-17
Payer: MEDICAID

## 2023-02-17 VITALS
OXYGEN SATURATION: 98 % | HEART RATE: 96 BPM | SYSTOLIC BLOOD PRESSURE: 136 MMHG | WEIGHT: 215 LBS | BODY MASS INDEX: 35.82 KG/M2 | HEIGHT: 65 IN | DIASTOLIC BLOOD PRESSURE: 76 MMHG | TEMPERATURE: 98.8 F

## 2023-02-17 DIAGNOSIS — I50.20 UNSPECIFIED SYSTOLIC (CONGESTIVE) HEART FAILURE: ICD-10-CM

## 2023-02-17 PROCEDURE — 99214 OFFICE O/P EST MOD 30 MIN: CPT | Mod: 25

## 2023-02-17 PROCEDURE — 36415 COLL VENOUS BLD VENIPUNCTURE: CPT

## 2023-02-17 PROCEDURE — 93000 ELECTROCARDIOGRAM COMPLETE: CPT

## 2023-02-17 RX ORDER — SEMAGLUTIDE 1.34 MG/ML
2 INJECTION, SOLUTION SUBCUTANEOUS
Qty: 12 | Refills: 10 | Status: ACTIVE | COMMUNITY
Start: 2023-02-17 | End: 1900-01-01

## 2023-02-19 LAB
ALBUMIN SERPL ELPH-MCNC: 4.5 G/DL
ALP BLD-CCNC: 71 U/L
ALT SERPL-CCNC: 14 U/L
ANION GAP SERPL CALC-SCNC: 17 MMOL/L
AST SERPL-CCNC: 13 U/L
BILIRUB SERPL-MCNC: 0.4 MG/DL
BUN SERPL-MCNC: 9 MG/DL
CALCIUM SERPL-MCNC: 9.8 MG/DL
CHLORIDE SERPL-SCNC: 101 MMOL/L
CHOLEST SERPL-MCNC: 208 MG/DL
CO2 SERPL-SCNC: 22 MMOL/L
CREAT SERPL-MCNC: 0.79 MG/DL
CREAT SPEC-SCNC: 202 MG/DL
EGFR: 95 ML/MIN/1.73M2
ESTIMATED AVERAGE GLUCOSE: 131 MG/DL
GLUCOSE SERPL-MCNC: 87 MG/DL
HBA1C MFR BLD HPLC: 6.2 %
HDLC SERPL-MCNC: 48 MG/DL
LDLC SERPL CALC-MCNC: 136 MG/DL
MICROALBUMIN 24H UR DL<=1MG/L-MCNC: 21 MG/DL
MICROALBUMIN/CREAT 24H UR-RTO: 104 MG/G
NONHDLC SERPL-MCNC: 159 MG/DL
NT-PROBNP SERPL-MCNC: 121 PG/ML
POTASSIUM SERPL-SCNC: 4.1 MMOL/L
PROT SERPL-MCNC: 8.4 G/DL
SODIUM SERPL-SCNC: 139 MMOL/L
TRIGL SERPL-MCNC: 117 MG/DL

## 2023-02-20 NOTE — HISTORY OF PRESENT ILLNESS
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 Med non compliance NSTEMI RCA occlusion L-R collaterals 2022\par \par \par here for fu she feels great takes torsemide only as needed did not do cardiac rehab tolerating meds very well did not take her meds yet today she never started her jardiance\par \par \par ECG NSR  LVH 2/17/2023\par \par 6/3/2022 echo mild to moderate AI Dilated RV Mild AS moderate MR Severe TR EF 45-50%\par RCH RAP 20 mmHg RVP 38/20 mmHg PA 35/20 mmHg mPAP 25 mmHg PCWP 15 TPG 10 CO 6.97 CI 3.4 \par Cardiac CAth 6/3/2022 Occluded RCA with collaterals \par CCTA score 75 occluded RCA \par Cardiac MRI 8/2022 no LGE EF normal

## 2023-02-20 NOTE — ASSESSMENT
[FreeTextEntry1] : CAD due to radiation her EF is stable she is on high dose statin \par AS/AI stable\par LV dysfunction she thinks she is taking her  entresto to 97/101 bid torsemide as needed\par stay on jardiacne 10 mg daily and toprol xl 25 mg two tablets daily\par she is very non compliant and i had a talk with her regarding her medication \par fu in 3 months

## 2023-02-20 NOTE — PROCEDURE
[Size: ______] : The left ventricular size is [unfilled] [Wall Motion: ______] : The left ventricular wall motion is [unfilled] [Ejection Fraction: ______] : The left ventricular ejection fraction is [unfilled] [Normal] : 3. No pericardial effusion. [ECG Atrial Arrhythmias] : no arrhythmias [de-identified] : 134 [de-identified] : 180/80 [de-identified] : 75%; METS 7.4 [de-identified] : mild left ventricular global hypokinesis LVEF 45-50% [de-identified] : Submaximal stress test target heart rate was not achieved no wall motion abnormalities post stress images no wall motion abnormalities [TWNoteComboBox1] : АНДРЕЙ [TWNoteComboBox2] : Doug [TWNoteComboBox4] : dyspnea [TWNoteComboBox3] : 2 [TWNoteComboBox5] : dyspnea [de-identified] : Dr. Pascual Malhotra (card) [de-identified] : Nicole Petri-Schoener, [facility] [de-identified] : Chest Pain [de-identified] : 1.0 [de-identified] : 0.9 [de-identified] : 4.8 [de-identified] : 3.8 [de-identified] : 2.9 [de-identified] : 3.5 [de-identified] : 34 mmHg [de-identified] : 45-50% [de-identified] : MIld global hypokinesis  [de-identified] : The left atrial volume index is 25 ml/m2. grade 1 diastolic dysfunction  [de-identified] : Trileaflet with Calcified leaflets mild to moderate aortic eccentric insufficiency aortic sclerosis without stenosis  [de-identified] : There is trace pulmonic regurgitation.   [de-identified] : Trace to mild tricuspid regurgitation. [de-identified] : The inferior vena cava is 1.6 cm. with greater than 50% [de-identified] : Mildly reduced left ventricular systolic function mild left ventricular hypokinesis normal right ventricle [de-identified] : Thickened mitral valve leaflets mild to moderate mitral regurgitation thickedn calcified aortic valve leaflets without stenosis mild to moderate aortic insufficiency [FreeTextEntry1] : : 1980\par Age: 38y\par Sex: F\par BP: 138/72\par Height: 167 cm\par Weight: 97 kg\par BSA: 2.1 m2

## 2023-06-06 ENCOUNTER — APPOINTMENT (OUTPATIENT)
Dept: HEART AND VASCULAR | Facility: CLINIC | Age: 43
End: 2023-06-06

## 2023-07-10 NOTE — PATIENT PROFILE ADULT - BRAND OF COVID-19 VACCINATION
July 10, 2023       Villa Dunaway MD  [address] [facility] [address]  Via In Basket      Patient: Pradeep Stuart   YOB: 1941   Date of Visit: 7/10/2023       Dear Dr. Dunaway:    Thank you for referring Pradeep Stuart to me for evaluation. Below are my notes for this visit with him.    If you have questions, please do not hesitate to call me. I look forward to following your patient along with you.      Sincerely,        Jany Shin MD        CC: No Recipients  Jany Smalls MD  7/10/2023  3:41 PM  Signed  Cardiology Office Visit    Chief Complaint/Reason for Visit:   Chief Complaint   Patient presents with   • Hospital F/U     Recent hospital visit to [facility] where he had present with chest pain and had stent placed.   • Referral     PCP- Dr. Dunaway          HISTORY OF PRESENT ILLNESS:   Pradeep Stuart is a 82 year old male with   Coronary artery disease status post coronary artery bypass graft surgery 1997; Cardiac cath 06/30/2023 Left Main 30%.    LAD 40 to 50% stenosis proximally. D1 has a 90% stenosis.Mid LAD to Dist LAD 90% lesion,a drug-eluting stent was placed.Left Circumflex mild irregularities Right Coronary ArteryPDA has a focal 70 to 80% stenosis in 2 mm caliber vessel.   LIMA Graft To Mid LAD: The graft conduit type is LIMA. The graft was visualized by angiography. The graft is atretic.   Persistent atrial fibrillation  Aortic valve disease status post [facility] prosthetic aortic valve replacement 1997  Primary hypertension  Chronic diastolic congestive heart failure  Hypercholesterolemia    Type 2 diabetes mellitus  Obesity BMI 31.5  History of meningioma excision in the past    Patient reported heartburn last month.  He presented to the emergency room.  Troponin I was mildly elevated.  He underwent cardiac catheterization.  It showed a atretic LIMA to the LAD.  The native LAD had 90% stenosis in its mid distal segment for which a stent was placed.  He  has also 90% disease in the first diagonal branch, irregularities in the circumflex and 78% disease in the distal right PDA.  Patient has been doing well since discharge from the hospital.  He has easy bruisability on triple anticoagulation therapy including aspirin, clopidogrel and warfarin.  He has no orthopnea or NPD.  Patient continues to be active.  He is on a stationary bike for 20 to 30 minutes a day.  He does not smoke does not drink alcohol.      Review of Systems   Constitutional: Negative.    HENT: Negative.    Eyes: Negative.    Respiratory: Positive for shortness of breath.    Cardiovascular: Negative.    Gastrointestinal: Negative.    Endocrine: Negative.    Genitourinary: Negative.    Musculoskeletal: Negative.    Skin: Negative.    Allergic/Immunologic: Negative.    Neurological: Negative.    Hematological: Negative.    Psychiatric/Behavioral: Negative.         All other systems negative, except as noted above in HPI    PAST MEDICAL HISTORY:  Past Medical History:   Diagnosis Date   • ACE-inhibitor cough 1/26/2017   • Benign hypertensive heart disease without congestive heart failure 8/19/2010 7/14/20 increase amlodipine add lozol  2.5 mg daily Was non compliant w low salt diet   • BPH associated with nocturia 12/18/2009   • BPPV (benign paroxysmal positional vertigo) 3/28/2018   • Coronary arteriosclerosis 12/18/2009    1997 CABG  And   [facility] aortic valve 7/17/2020 good functioning aortic prosthetic valve.  Ejection fraction 60%.  No acute changes.  No pulmonary hypertension.   • Current use of long term anticoagulation 12/10/2018   • Dyslipidemia associated with type 2 diabetes mellitus (CMD) 1/11/2013    Stable w meds   • H/O aortic valve disorder 12/18/2009   • Paroxysmal atrial fibrillation (CMD) 4/23/2023 4/20/23 pre procedure a fib 5/5/23   Dr jass fabian Warfarin same  metoprolol same Asymptomatic at present    • Pneumonia due to COVID-19 virus 8/21/2021 8/21/21 admit  [facility]  covid  pneumonia  Did well   • S/P AVR (aortic valve replacement) 12/10/2018    1997 CABG  And [facility] babs AVR July 2020  ECHO  Stable valve  EF  60%   • Stage 3a chronic kidney disease (CMD) 2/9/2021 2/8/21 GFR  57   • Type 2 diabetes mellitus with stage 3a chronic kidney disease, without long-term current use of insulin (CMD) 12/18/2009    May 2020  HAIC  6.7 1/6/23 dr roa   Cataract OU  No retinopathy       PAST SURGICAL HISTORY:  Past Surgical History:   Procedure Laterality Date   • Aortic valve replacement     • Coronary artery bypass graft          FAMILY HISTORY:  Family History   Problem Relation Age of Onset   • Patient is unaware of any medical problems Mother    • Natural Causes Father    • Diabetes Sister    • Heart disease Sister    • Heart disease Brother    • Heart disease Brother        SOCIAL HISTORY:   Social History     Tobacco Use   Smoking Status Never   • Passive exposure: Never   Smokeless Tobacco Never     Social History     Substance and Sexual Activity   Alcohol Use No     Social History     Substance and Sexual Activity   Drug Use No       ALLERGIES:   ALLERGIES:  Ace inhibitors     MEDICATIONS:   Current Outpatient Medications   Medication Sig Dispense Refill   • EQ Aspirin Adult Low Dose 81 MG EC tablet TAKE 1 TABLET BY MOUTH ONCE DAILY FOR 30 DAYS     • clopidogrel (PLAVIX) 75 MG tablet Take 1 tablet by mouth daily. 90 tablet 1   • atorvastatin (LIPITOR) 40 MG tablet Take 1 tablet by mouth daily. 90 tablet 3   • nitroGLYCERIN (NITROSTAT) 0.4 MG sublingual tablet Place 1 tablet under the tongue every 5 minutes as needed for Chest pain. 25 tablet 1   • tamsulosin (FLOMAX) 0.4 MG Cap TAKE 1 CAPSULE BY MOUTH ONCE DAILY AFTER A MEAL 90 capsule 3   • finasteride (PROSCAR) 5 MG tablet Take 1 tablet by mouth daily. 90 tablet 1   • warfarin (COUMADIN) 2 MG tablet TAKE 1 & 1/2 (ONE & ONE-HALF) TABLETS BY MOUTH ONCE DAILY AS DIRECTED BY [facility] [phone] 135 tablet 3   •  metoPROLOL succinate (TOPROL-XL) 100 MG 24 hr tablet One po daily 90 tablet 3   • metFORMIN (GLUCOPHAGE-XR) 500 MG 24 hr tablet Take 1 tablet by mouth daily (with breakfast). 90 tablet 3   • amLODIPine (NORVASC) 5 MG tablet Take 1 tablet by mouth in the morning and 1 tablet in the evening. 180 tablet 3   • indapamide (LOZOL) 2.5 MG tablet Take 1 tablet by mouth daily. 90 tablet 3   • losartan (COZAAR) 50 MG tablet Take 2 tablets daily. 180 tablet 3     No current facility-administered medications for this visit.        PHYSICAL  EXAMINATION  Visit Vitals  /72   Pulse 80   Wt 94.3 kg (208 lb)   BMI 31.63 kg/m²        Physical Exam  Constitutional:       Appearance: He is well-developed.   HENT:      Head: Normocephalic.   Eyes:      Pupils: Pupils are equal, round, and reactive to light.   Cardiovascular:      Rate and Rhythm: Rhythm irregular.      Comments: Crisp prosthetic heart sounds  Pulmonary:      Breath sounds: Decreased breath sounds present.   Abdominal:      Palpations: Abdomen is soft.   Musculoskeletal:         General: Normal range of motion.      Cervical back: Neck supple.   Skin:     General: Skin is warm.   Neurological:      Mental Status: He is alert.          Labs: Reviewed   Cholesterol (mg/dL)   Date Value   04/05/2023 175     HDL (mg/dL)   Date Value   04/05/2023 57     Triglycerides (mg/dL)   Date Value   04/05/2023 199 (H)     LDL (mg/dL)   Date Value   04/05/2023 78        IMPRESSION/PLAN:  S/P AVR (aortic valve replacement)  1997 CABG  And [facility] AVR  July 2020  ECHO  Stable valve  EF  60%  Continue warfarin goal INR 2-3      Coronary arteriosclerosis  Status post CABG 1997   status post non-ST MI 06/25/2023.  The LIMA to the LAD is atretic.  Patient had successful stent placement to the mid distal LAD.  There is 90% disease in the first diagonal branch, mild disease in the circumflex and 70 to 80% disease in the distal LAD and right PDA.  Patient on dual antiplatelet therapy and  warfarin for a total of 1 month.  After 1 month aspirin can be discontinued and patient maintained on clopidogrel and warfarin with goal INR 2-3    Dyslipidemia associated with type 2 diabetes mellitus (CMD)  On statin  Goal LDL cholesterol below 70 mg percent.  Increase atorvastatin to 40 mg daily    Persistent atrial fibrillation (CMD)  Warfarin to keep INR 2-3.  Continue metoprolol    Chronic diastolic congestive heart failure (CMD)  Congestive heart failure compensated.  We will consider SGLT2 inhibitor in the future     Plan  Nitroglycerin sublingual as needed  Increase atorvastatin to 40 mg daily  Stop aspirin after a total of 1 month of dual antiplatelet and warfarin and patient to be maintained on warfarin and clopidogrel starting June 30, 2023      Orders Placed This Encounter   • EQ Aspirin Adult Low Dose 81 MG EC tablet   • DISCONTD: clopidogrel (PLAVIX) 75 MG tablet   • clopidogrel (PLAVIX) 75 MG tablet   • atorvastatin (LIPITOR) 40 MG tablet   • nitroGLYCERIN (NITROSTAT) 0.4 MG sublingual tablet        7/10/2023    No follow-ups on file.    Martin Shin MD  Cardiology     Moderna dose 1 and 2

## 2023-10-19 ENCOUNTER — APPOINTMENT (OUTPATIENT)
Dept: HEART AND VASCULAR | Facility: CLINIC | Age: 43
End: 2023-10-19
Payer: MEDICAID

## 2023-10-19 ENCOUNTER — NON-APPOINTMENT (OUTPATIENT)
Age: 43
End: 2023-10-19

## 2023-10-19 VITALS
HEART RATE: 86 BPM | WEIGHT: 215 LBS | BODY MASS INDEX: 35.82 KG/M2 | DIASTOLIC BLOOD PRESSURE: 80 MMHG | OXYGEN SATURATION: 98 % | SYSTOLIC BLOOD PRESSURE: 160 MMHG | TEMPERATURE: 98.2 F | HEIGHT: 65 IN

## 2023-10-19 DIAGNOSIS — I10 ESSENTIAL (PRIMARY) HYPERTENSION: ICD-10-CM

## 2023-10-19 PROCEDURE — 99214 OFFICE O/P EST MOD 30 MIN: CPT | Mod: 25

## 2023-10-19 PROCEDURE — 36415 COLL VENOUS BLD VENIPUNCTURE: CPT

## 2023-10-19 PROCEDURE — 93000 ELECTROCARDIOGRAM COMPLETE: CPT

## 2023-10-19 RX ORDER — EMPAGLIFLOZIN 10 MG/1
10 TABLET, FILM COATED ORAL DAILY
Qty: 90 | Refills: 5 | Status: ACTIVE | COMMUNITY
Start: 2022-11-16 | End: 1900-01-01

## 2023-10-20 DIAGNOSIS — R73.03 PREDIABETES.: ICD-10-CM

## 2023-10-20 LAB
ALBUMIN SERPL ELPH-MCNC: 4.2 G/DL
ALP BLD-CCNC: 59 U/L
ALT SERPL-CCNC: 17 U/L
ANION GAP SERPL CALC-SCNC: 12 MMOL/L
AST SERPL-CCNC: 20 U/L
BILIRUB SERPL-MCNC: 0.2 MG/DL
BUN SERPL-MCNC: 13 MG/DL
CALCIUM SERPL-MCNC: 9.4 MG/DL
CHLORIDE SERPL-SCNC: 105 MMOL/L
CHOLEST SERPL-MCNC: 198 MG/DL
CO2 SERPL-SCNC: 23 MMOL/L
CREAT SERPL-MCNC: 0.74 MG/DL
EGFR: 102 ML/MIN/1.73M2
ESTIMATED AVERAGE GLUCOSE: 123 MG/DL
GLUCOSE SERPL-MCNC: 110 MG/DL
HBA1C MFR BLD HPLC: 5.9 %
HDLC SERPL-MCNC: 48 MG/DL
LDLC SERPL CALC-MCNC: 126 MG/DL
NONHDLC SERPL-MCNC: 150 MG/DL
NT-PROBNP SERPL-MCNC: 364 PG/ML
POTASSIUM SERPL-SCNC: 4.1 MMOL/L
PROT SERPL-MCNC: 7.7 G/DL
SODIUM SERPL-SCNC: 141 MMOL/L
TRIGL SERPL-MCNC: 138 MG/DL

## 2023-10-20 RX ORDER — SEMAGLUTIDE 0.68 MG/ML
2 INJECTION, SOLUTION SUBCUTANEOUS
Qty: 4 | Refills: 3 | Status: ACTIVE | COMMUNITY
Start: 2023-10-20 | End: 1900-01-01

## 2023-11-02 ENCOUNTER — TRANSCRIPTION ENCOUNTER (OUTPATIENT)
Age: 43
End: 2023-11-02

## 2023-11-07 NOTE — PROGRESS NOTE ADULT - PROBLEM SELECTOR PLAN 4
-TTE 6/3 revealed severe unrestricted TR, moderate MR (MV mildly thickened), mild-mod AR, mild AS  -Structural heart (Dr. Sousa/Gabby) consulted, recommend Advanced CHF consult, possible RHC Pt states when he woke up his R hand was numb, resolved. Pt also noticed his R hand twitching/within normal limits - ECHO findings as above   - Structural heart (Dr. Sousa/Gabby) consulted, appreciate recs. No intervention this admission; will f/u outpatient

## 2024-03-05 ENCOUNTER — APPOINTMENT (OUTPATIENT)
Dept: HEART AND VASCULAR | Facility: CLINIC | Age: 44
End: 2024-03-05

## 2024-03-28 ENCOUNTER — APPOINTMENT (OUTPATIENT)
Dept: HEART AND VASCULAR | Facility: CLINIC | Age: 44
End: 2024-03-28
Payer: MEDICAID

## 2024-03-28 ENCOUNTER — NON-APPOINTMENT (OUTPATIENT)
Age: 44
End: 2024-03-28

## 2024-03-28 VITALS
WEIGHT: 204 LBS | OXYGEN SATURATION: 99 % | HEART RATE: 104 BPM | SYSTOLIC BLOOD PRESSURE: 162 MMHG | TEMPERATURE: 98 F | DIASTOLIC BLOOD PRESSURE: 82 MMHG | HEIGHT: 65 IN | BODY MASS INDEX: 33.99 KG/M2

## 2024-03-28 DIAGNOSIS — I42.9 CARDIOMYOPATHY, UNSPECIFIED: ICD-10-CM

## 2024-03-28 PROCEDURE — 99214 OFFICE O/P EST MOD 30 MIN: CPT | Mod: 25

## 2024-03-28 PROCEDURE — 36415 COLL VENOUS BLD VENIPUNCTURE: CPT

## 2024-03-28 PROCEDURE — 93000 ELECTROCARDIOGRAM COMPLETE: CPT

## 2024-03-29 ENCOUNTER — TRANSCRIPTION ENCOUNTER (OUTPATIENT)
Age: 44
End: 2024-03-29

## 2024-03-29 DIAGNOSIS — I25.2 OLD MYOCARDIAL INFARCTION: ICD-10-CM

## 2024-03-29 LAB
ALBUMIN SERPL ELPH-MCNC: 4.4 G/DL
ALP BLD-CCNC: 59 U/L
ALT SERPL-CCNC: 10 U/L
ANION GAP SERPL CALC-SCNC: 13 MMOL/L
AST SERPL-CCNC: 17 U/L
BILIRUB SERPL-MCNC: 0.2 MG/DL
BUN SERPL-MCNC: 11 MG/DL
CALCIUM SERPL-MCNC: 9.9 MG/DL
CHLORIDE SERPL-SCNC: 102 MMOL/L
CHOLEST SERPL-MCNC: 227 MG/DL
CO2 SERPL-SCNC: 22 MMOL/L
CREAT SERPL-MCNC: 0.73 MG/DL
CREAT SPEC-SCNC: 50 MG/DL
EGFR: 104 ML/MIN/1.73M2
ESTIMATED AVERAGE GLUCOSE: 131 MG/DL
GLUCOSE SERPL-MCNC: 87 MG/DL
HBA1C MFR BLD HPLC: 6.2 %
HDLC SERPL-MCNC: 57 MG/DL
LDLC SERPL CALC-MCNC: 152 MG/DL
MICROALBUMIN 24H UR DL<=1MG/L-MCNC: 3.2 MG/DL
MICROALBUMIN/CREAT 24H UR-RTO: 65 MG/G
NONHDLC SERPL-MCNC: 169 MG/DL
NT-PROBNP SERPL-MCNC: 356 PG/ML
POTASSIUM SERPL-SCNC: 4.2 MMOL/L
PROT SERPL-MCNC: 8.4 G/DL
SODIUM SERPL-SCNC: 137 MMOL/L
T4 FREE SERPL-MCNC: 1 NG/DL
TRIGL SERPL-MCNC: 98 MG/DL
TSH SERPL-ACNC: 8.78 UIU/ML

## 2024-03-29 RX ORDER — ATORVASTATIN CALCIUM 40 MG/1
40 TABLET, FILM COATED ORAL
Qty: 90 | Refills: 2 | Status: DISCONTINUED | COMMUNITY
Start: 2022-06-07 | End: 2024-03-29

## 2024-03-29 RX ORDER — SEMAGLUTIDE 0.25 MG/.5ML
0.25 INJECTION, SOLUTION SUBCUTANEOUS
Qty: 4 | Refills: 5 | Status: ACTIVE | COMMUNITY
Start: 2024-03-29 | End: 1900-01-01

## 2024-03-29 NOTE — PHYSICAL EXAM
[Well Developed] : well developed [Well Nourished] : well nourished [No Acute Distress] : no acute distress [Normal Venous Pressure] : normal venous pressure [No Carotid Bruit] : no carotid bruit [No Murmur] : no murmur [Normal S1, S2] : normal S1, S2 [No Rub] : no rub [No Gallop] : no gallop [Clear Lung Fields] : clear lung fields [Good Air Entry] : good air entry [No Respiratory Distress] : no respiratory distress  [Non Tender] : non-tender [Soft] : abdomen soft [Normal Gait] : normal gait [Normal Bowel Sounds] : normal bowel sounds [No Masses/organomegaly] : no masses/organomegaly [No Edema] : no edema [No Cyanosis] : no cyanosis [No Clubbing] : no clubbing [No Rash] : no rash [No Varicosities] : no varicosities [Moves all extremities] : moves all extremities [No Skin Lesions] : no skin lesions [No Focal Deficits] : no focal deficits [Normal Speech] : normal speech [Alert and Oriented] : alert and oriented [Normal memory] : normal memory [General Appearance - Well Developed] : well developed [Normal Appearance] : normal appearance [Well Groomed] : well groomed [General Appearance - Well Nourished] : well nourished [No Deformities] : no deformities [General Appearance - In No Acute Distress] : no acute distress [Normal Conjunctiva] : the conjunctiva exhibited no abnormalities [Eyelids - No Xanthelasma] : the eyelids demonstrated no xanthelasmas [Normal Oral Mucosa] : normal oral mucosa [No Oral Cyanosis] : no oral cyanosis [No Oral Pallor] : no oral pallor [Normal Jugular Venous A Waves Present] : normal jugular venous A waves present [Normal Jugular Venous V Waves Present] : normal jugular venous V waves present [No Jugular Venous Ceron A Waves] : no jugular venous ceron A waves [Respiration, Rhythm And Depth] : normal respiratory rhythm and effort [Exaggerated Use Of Accessory Muscles For Inspiration] : no accessory muscle use [Auscultation Breath Sounds / Voice Sounds] : lungs were clear to auscultation bilaterally [Heart Rate And Rhythm] : heart rate and rhythm were normal [Heart Sounds] : normal S1 and S2 [Murmurs] : no murmurs present [Abdomen Soft] : soft [Abdomen Tenderness] : non-tender [Abdomen Mass (___ Cm)] : no abdominal mass palpated [Gait - Sufficient For Exercise Testing] : the gait was sufficient for exercise testing [Abnormal Walk] : normal gait [Nail Clubbing] : no clubbing of the fingernails [Cyanosis, Localized] : no localized cyanosis [Petechial Hemorrhages (___cm)] : no petechial hemorrhages [] : no rash [Skin Color & Pigmentation] : normal skin color and pigmentation [No Venous Stasis] : no venous stasis [Skin Lesions] : no skin lesions [No Skin Ulcers] : no skin ulcer [No Xanthoma] : no  xanthoma was observed [Oriented To Time, Place, And Person] : oriented to person, place, and time [Affect] : the affect was normal [Mood] : the mood was normal [No Anxiety] : not feeling anxious [FreeTextEntry1] : JEREMIAH to carotids

## 2024-03-29 NOTE — PROCEDURE
[Size: ______] : The left ventricular size is [unfilled] [Wall Motion: ______] : The left ventricular wall motion is [unfilled] [Ejection Fraction: ______] : The left ventricular ejection fraction is [unfilled] [Normal] : 3. No pericardial effusion. [ECG Atrial Arrhythmias] : no arrhythmias [de-identified] : 134 [de-identified] : 180/80 [de-identified] : 75%; METS 7.4 [de-identified] : mild left ventricular global hypokinesis LVEF 45-50% [de-identified] : Submaximal stress test target heart rate was not achieved no wall motion abnormalities post stress images no wall motion abnormalities [TWNoteComboBox1] : АНДРЕЙ [TWNoteComboBox2] : Doug [TWNoteComboBox4] : dyspnea [TWNoteComboBox3] : 2 [TWNoteComboBox5] : dyspnea [de-identified] : Dr. Pascual Malhotra (card) [de-identified] : Nicole Petri-Schoener, [facility] [de-identified] : Chest Pain [de-identified] : 1.0 [de-identified] : 0.9 [de-identified] : 4.8 [de-identified] : 3.8 [de-identified] : 2.9 [de-identified] : 3.5 [de-identified] : 34 mmHg [de-identified] : 45-50% [de-identified] : MIld global hypokinesis  [de-identified] : The left atrial volume index is 25 ml/m2. grade 1 diastolic dysfunction  [de-identified] : Trileaflet with Calcified leaflets mild to moderate aortic eccentric insufficiency aortic sclerosis without stenosis  [de-identified] : There is trace pulmonic regurgitation.   [de-identified] : Trace to mild tricuspid regurgitation. [de-identified] : The inferior vena cava is 1.6 cm. with greater than 50% [de-identified] : Mildly reduced left ventricular systolic function mild left ventricular hypokinesis normal right ventricle [de-identified] : Thickened mitral valve leaflets mild to moderate mitral regurgitation thickedn calcified aortic valve leaflets without stenosis mild to moderate aortic insufficiency [FreeTextEntry1] : : 1980\par  Age: 38y\par  Sex: F\par  BP: 138/72\par  Height: 167 cm\par  Weight: 97 kg\par  BSA: 2.1 m2

## 2024-03-29 NOTE — HISTORY OF PRESENT ILLNESS
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 she never comes to her visits taking her medications. does nor report cp or sob    ECG NSR  LVH  3/29/24  6/3/2022 echo mild to moderate AI Dilated RV Mild AS moderate MR Severe TR EF 45-50% RCH RAP 20 mmHg RVP 38/20 mmHg PA 35/20 mmHg mPAP 25 mmHg PCWP 15 TPG 10 CO 6.97 CI 3.4  Cardiac CAth 6/3/2022 Occluded RCA with collaterals  CCTA score 75 occluded RCA  Cardiac MRI 8/2022 no LGE EF normal

## 2024-03-29 NOTE — ASSESSMENT
[FreeTextEntry1] : AS echo for aortic stenosis  LV dysfunction she thinks she is taking her entresto to 97/101 bid torsemide as needed  stay on jardiacne 10 mg daily and toprol xl 25 mg two tablets daily  she is very non compliant and i had a talk with her regarding her medication  CAD on atorvastatin 80 mg daily   fu in 3 months.

## 2024-04-01 RX ORDER — METOPROLOL SUCCINATE 50 MG/1
50 TABLET, EXTENDED RELEASE ORAL DAILY
Qty: 90 | Refills: 3 | Status: ACTIVE | COMMUNITY
Start: 2022-07-08 | End: 1900-01-01

## 2024-04-01 RX ORDER — SPIRONOLACTONE 25 MG/1
25 TABLET ORAL
Qty: 90 | Refills: 5 | Status: ACTIVE | COMMUNITY
Start: 2022-06-07 | End: 1900-01-01

## 2024-04-01 RX ORDER — SACUBITRIL AND VALSARTAN 97; 103 MG/1; MG/1
97-103 TABLET, FILM COATED ORAL TWICE DAILY
Qty: 180 | Refills: 3 | Status: ACTIVE | COMMUNITY
Start: 2022-07-08 | End: 1900-01-01

## 2024-05-07 ENCOUNTER — TRANSCRIPTION ENCOUNTER (OUTPATIENT)
Age: 44
End: 2024-05-07

## 2024-07-24 ENCOUNTER — APPOINTMENT (OUTPATIENT)
Dept: HEART AND VASCULAR | Facility: CLINIC | Age: 44
End: 2024-07-24
